# Patient Record
Sex: FEMALE | Race: WHITE | NOT HISPANIC OR LATINO | URBAN - METROPOLITAN AREA
[De-identification: names, ages, dates, MRNs, and addresses within clinical notes are randomized per-mention and may not be internally consistent; named-entity substitution may affect disease eponyms.]

---

## 2023-08-28 ENCOUNTER — INITIAL PRENATAL (OUTPATIENT)
Dept: OBGYN CLINIC | Facility: CLINIC | Age: 33
End: 2023-08-28

## 2023-08-28 VITALS — BODY MASS INDEX: 24.16 KG/M2 | HEIGHT: 68 IN | WEIGHT: 159.4 LBS

## 2023-08-28 DIAGNOSIS — Z3A.24 24 WEEKS GESTATION OF PREGNANCY: ICD-10-CM

## 2023-08-28 DIAGNOSIS — Z34.91 ENCOUNTER FOR SUPERVISION OF NORMAL PREGNANCY IN FIRST TRIMESTER, UNSPECIFIED GRAVIDITY: ICD-10-CM

## 2023-08-28 DIAGNOSIS — Z13.71 SCREENING FOR GENETIC DISEASE CARRIER STATUS: Primary | ICD-10-CM

## 2023-08-28 RX ORDER — FLUTICASONE PROPIONATE 50 MCG
1 SPRAY, SUSPENSION (ML) NASAL DAILY
COMMUNITY

## 2023-08-28 NOTE — PROGRESS NOTES
OB INTAKE INTERVIEW  Pt presents for OB intake. Transfer from [facility]. 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 Ultrasound date: 2023 10 weeks 2 days     Estimated Date of Delivery: 2023    Current Issues:  Constipation :   No  Headaches :   No  Cramping:  No  Spotting :   No    Interview education  • [facility] Pregnancy Essentials reviewed and discussed   • Baby and  [address] Handout  • [facility] MFM Handout  • Discussed genetic testing  • Prenatal lab work: Scripts printed and given to pt. • Influenza vaccine given today: No  • Discussed Tdap vaccine. Immunizations:   Immunization History   Administered Date(s) Administered   • COVID-19 PFIZER VACCINE 0.3 ML IM 2021       Diabetes              Pregestational DM:  No              hx of GDM: No              BMI >35: No              first degree relative with type 2 diabetes: No              hx of PCOS: Yes              current metformin use:  No              prior hx of LGA/macrosomia: No                   Hypertension Hx of chronic HTN: No              hx of gestational HTN: No              hx of preeclampsia, eclampsia, or HELLP syndrome: No              Family h/o preeclampsia: No              Age 28 or older: No              Multifetal gestation: No  Type 1 or Type 2 DM: No  Renal Disease: No  Autoimmune disease (systemic lupus erythematosus, antiphospholipid antibody syndrome): No  Nulliparity: Yes  Obesity (BMI over 30): No  More than 10 year pregnancy interval: No  Previous IUGR, low birthweight or small for gestational age: No       Immunizations:              influenza vaccine: N/A              discussed Tdap vaccine administration at 27-28 weeks   Covid Vaccination: Vaccinated      Dental visit with last 6 months - Yes  PHQ-2/9 score: 0       MyChart activated (not 1518 years of age)?: Yes    The patient was oriented to our practice and all questions were answered.   Interviewed by: Young Perez RN 08/28/23

## 2023-08-31 ENCOUNTER — APPOINTMENT (OUTPATIENT)
Dept: LAB | Facility: HOSPITAL | Age: 33
End: 2023-08-31
Payer: COMMERCIAL

## 2023-08-31 ENCOUNTER — ROUTINE PRENATAL (OUTPATIENT)
Dept: PERINATAL CARE | Facility: OTHER | Age: 33
End: 2023-08-31
Payer: COMMERCIAL

## 2023-08-31 VITALS
DIASTOLIC BLOOD PRESSURE: 60 MMHG | BODY MASS INDEX: 23.67 KG/M2 | WEIGHT: 156.2 LBS | HEIGHT: 68 IN | HEART RATE: 81 BPM | SYSTOLIC BLOOD PRESSURE: 98 MMHG

## 2023-08-31 DIAGNOSIS — Z33.1 PREGNANT STATE, INCIDENTAL: ICD-10-CM

## 2023-08-31 DIAGNOSIS — Z13.71 SCREENING FOR GENETIC DISEASE CARRIER STATUS: ICD-10-CM

## 2023-08-31 DIAGNOSIS — Z36.89 ENCOUNTER FOR FETAL ANATOMIC SURVEY: ICD-10-CM

## 2023-08-31 DIAGNOSIS — Z3A.24 24 WEEKS GESTATION OF PREGNANCY: Primary | ICD-10-CM

## 2023-08-31 DIAGNOSIS — Z3A.24 24 WEEKS GESTATION OF PREGNANCY: ICD-10-CM

## 2023-08-31 DIAGNOSIS — Z34.91 ENCOUNTER FOR SUPERVISION OF NORMAL PREGNANCY IN FIRST TRIMESTER, UNSPECIFIED GRAVIDITY: ICD-10-CM

## 2023-08-31 DIAGNOSIS — Z3A.25 25 WEEKS GESTATION OF PREGNANCY: Primary | ICD-10-CM

## 2023-08-31 DIAGNOSIS — Z36.9 UNSPECIFIED ANTENATAL SCREENING: ICD-10-CM

## 2023-08-31 PROBLEM — Z13.79 ASHKENAZI JEWISH ANCESTRY REQUIRING POPULATION-SPECIFIC GENETIC SCREENING: Status: ACTIVE | Noted: 2023-08-31

## 2023-08-31 PROBLEM — E28.2 POLYCYSTIC OVARIAN SYNDROME: Status: ACTIVE | Noted: 2023-08-31

## 2023-08-31 LAB
ABO GROUP BLD: NORMAL
BASOPHILS # BLD AUTO: 0.06 THOUSANDS/ÂΜL (ref 0–0.1)
BASOPHILS NFR BLD AUTO: 1 % (ref 0–1)
BLD GP AB SCN SERPL QL: NEGATIVE
EOSINOPHIL # BLD AUTO: 0.14 THOUSAND/ÂΜL (ref 0–0.61)
EOSINOPHIL NFR BLD AUTO: 1 % (ref 0–6)
ERYTHROCYTE [DISTWIDTH] IN BLOOD BY AUTOMATED COUNT: 13.9 % (ref 11.6–15.1)
HCT VFR BLD AUTO: 39.7 % (ref 34.8–46.1)
HGB BLD-MCNC: 13.5 G/DL (ref 11.5–15.4)
IMM GRANULOCYTES # BLD AUTO: 0.05 THOUSAND/UL (ref 0–0.2)
IMM GRANULOCYTES NFR BLD AUTO: 0 % (ref 0–2)
LYMPHOCYTES # BLD AUTO: 2.7 THOUSANDS/ÂΜL (ref 0.6–4.47)
LYMPHOCYTES NFR BLD AUTO: 21 % (ref 14–44)
MCH RBC QN AUTO: 31.8 PG (ref 26.8–34.3)
MCHC RBC AUTO-ENTMCNC: 34 G/DL (ref 31.4–37.4)
MCV RBC AUTO: 94 FL (ref 82–98)
MONOCYTES # BLD AUTO: 0.6 THOUSAND/ÂΜL (ref 0.17–1.22)
MONOCYTES NFR BLD AUTO: 5 % (ref 4–12)
NEUTROPHILS # BLD AUTO: 9.33 THOUSANDS/ÂΜL (ref 1.85–7.62)
NEUTS SEG NFR BLD AUTO: 72 % (ref 43–75)
NRBC BLD AUTO-RTO: 0 /100 WBCS
PLATELET # BLD AUTO: 216 THOUSANDS/UL (ref 149–390)
PMV BLD AUTO: 9.6 FL (ref 8.9–12.7)
RBC # BLD AUTO: 4.24 MILLION/UL (ref 3.81–5.12)
RH BLD: POSITIVE
RUBV IGG SERPL IA-ACNC: 29 IU/ML
SPECIMEN EXPIRATION DATE: NORMAL
WBC # BLD AUTO: 12.88 THOUSAND/UL (ref 4.31–10.16)

## 2023-08-31 PROCEDURE — 87389 HIV-1 AG W/HIV-1&-2 AB AG IA: CPT

## 2023-08-31 PROCEDURE — 86787 VARICELLA-ZOSTER ANTIBODY: CPT

## 2023-08-31 PROCEDURE — 86900 BLOOD TYPING SEROLOGIC ABO: CPT

## 2023-08-31 PROCEDURE — 87340 HEPATITIS B SURFACE AG IA: CPT

## 2023-08-31 PROCEDURE — 86780 TREPONEMA PALLIDUM: CPT

## 2023-08-31 PROCEDURE — 87086 URINE CULTURE/COLONY COUNT: CPT

## 2023-08-31 PROCEDURE — 36415 COLL VENOUS BLD VENIPUNCTURE: CPT

## 2023-08-31 PROCEDURE — 86901 BLOOD TYPING SEROLOGIC RH(D): CPT

## 2023-08-31 PROCEDURE — 86850 RBC ANTIBODY SCREEN: CPT

## 2023-08-31 PROCEDURE — 76805 OB US >/= 14 WKS SNGL FETUS: CPT | Performed by: OBSTETRICS & GYNECOLOGY

## 2023-08-31 PROCEDURE — 85025 COMPLETE CBC W/AUTO DIFF WBC: CPT

## 2023-08-31 PROCEDURE — 86803 HEPATITIS C AB TEST: CPT

## 2023-08-31 PROCEDURE — 86762 RUBELLA ANTIBODY: CPT

## 2023-08-31 NOTE — LETTER
August 31, 2023     Gerson Buitrago MD  [address] [facility]  [address]    Patient: Monique Mchugh   YOB: 1990   Date of Visit: 8/31/2023       Dear Dr. Mckenna Srinivasan:    Thank you for referring Monique Mchugh to me for evaluation. Below are my notes for this consultation. If you have questions, please do not hesitate to call me. I look forward to following your patient along with you. Sincerely,        Jason Barone MD        CC: No Recipients    Jason Barone MD  8/31/2023  4:56 PM  Sign when Signing Visit  [address]: Monique Mchugh was seen today at 24w3d for anatomic survey ultrasound. See ultrasound report under "OB Procedures" tab. Please don't hesitate to contact our office with any concerns or questions.  -Jason Barone MD        Danette Blount  8/31/2023  4:29 PM  Sign when Signing Visit    Patient chose to have [facility] Non-invasive Prenatal Screen with fetal sex. Patient given brochure and is aware [facility] will contact patients insurance and coordinate coverage. Patient made aware she will need to respond to text message or e-mail from [address] within 2 business days or testing will be run through insurance. Patient informed text message will come from area code  "415"  Provided [employer] # [phone] and web site : [email or web address]. Blood collection tubes labeled with patient identifiers (name, date of birth)    printed [facility] lab order and test kit given with FED EX packaging (Tracking # [id number])  given to patient to take to [facility] outpatient lab for blood collection. Requested patient notify MFM ( via phone call or Guatemalan Mems message)  when blood collected so office can f/u results    Copy of lab order scanned to Epic media. Maternal Fetal Medicine will have results in approximately 7-10 business days and will call patient or notify via [address] [place] [address].   Patient aware viewing lab result online will reveal fetal sex If ordered. Patient verbalized understanding of all instructions and no questions at this time.

## 2023-08-31 NOTE — PROGRESS NOTES
[address]: Mar Landin was seen today at 24w3d for anatomic survey ultrasound. See ultrasound report under "OB Procedures" tab.   Please don't hesitate to contact our office with any concerns or questions.  -Tuan Barragan MD

## 2023-08-31 NOTE — PROGRESS NOTES
Patient chose to have [facility] Non-invasive Prenatal Screen with fetal sex. Patient given brochure and is aware [facility] will contact patients insurance and coordinate coverage. Patient made aware she will need to respond to text message or e-mail from [address] within 2 business days or testing will be run through insurance. Patient informed text message will come from area code  "415"  Provided [employer] # [phone] and web site : [email or web address]. Blood collection tubes labeled with patient identifiers (name, date of birth)    printed [facility] lab order and test kit given with FED EX packaging (Tracking # [id number])  given to patient to take to [facility] outpatient lab for blood collection. Requested patient notify MFM ( via phone call or Mo-[employer] message)  when blood collected so office can f/u results    Copy of lab order scanned to Epic media. Maternal Fetal Medicine will have results in approximately 7-10 business days and will call patient or notify via [address] [place] [address]. Patient aware viewing lab result online will reveal fetal sex If ordered. Patient verbalized understanding of all instructions and no questions at this time.

## 2023-09-01 LAB
BACTERIA UR CULT: NORMAL
HBV SURFACE AG SER QL: NORMAL
HCV AB SER QL: NORMAL
TREPONEMA PALLIDUM IGG+IGM AB [PRESENCE] IN SERUM OR PLASMA BY IMMUNOASSAY: NORMAL
VZV IGG SER QL IA: NORMAL

## 2023-09-02 LAB — HIV 1+2 AB+HIV1 P24 AG SERPL QL IA: NON REACTIVE

## 2023-09-05 ENCOUNTER — ROUTINE PRENATAL (OUTPATIENT)
Dept: OBGYN CLINIC | Facility: CLINIC | Age: 33
End: 2023-09-05
Payer: COMMERCIAL

## 2023-09-05 ENCOUNTER — TELEMEDICINE (OUTPATIENT)
Facility: HOSPITAL | Age: 33
End: 2023-09-05
Payer: COMMERCIAL

## 2023-09-05 VITALS — BODY MASS INDEX: 24.63 KG/M2 | WEIGHT: 162 LBS | DIASTOLIC BLOOD PRESSURE: 62 MMHG | SYSTOLIC BLOOD PRESSURE: 108 MMHG

## 2023-09-05 DIAGNOSIS — Z3A.25 25 WEEKS GESTATION OF PREGNANCY: ICD-10-CM

## 2023-09-05 DIAGNOSIS — Z34.02 ENCOUNTER FOR SUPERVISION OF NORMAL FIRST PREGNANCY, SECOND TRIMESTER: Primary | ICD-10-CM

## 2023-09-05 DIAGNOSIS — Z31.5 ENCOUNTER FOR PROCREATIVE GENETIC COUNSELING: ICD-10-CM

## 2023-09-05 DIAGNOSIS — Z36.9 ENCOUNTER FOR ANTENATAL SCREENING OF MOTHER: Primary | ICD-10-CM

## 2023-09-05 PROCEDURE — 87491 CHLMYD TRACH DNA AMP PROBE: CPT | Performed by: PHYSICIAN ASSISTANT

## 2023-09-05 PROCEDURE — 99213 OFFICE O/P EST LOW 20 MIN: CPT | Performed by: PHYSICIAN ASSISTANT

## 2023-09-05 PROCEDURE — NC001 PR NO CHARGE

## 2023-09-05 PROCEDURE — 87591 N.GONORRHOEAE DNA AMP PROB: CPT | Performed by: PHYSICIAN ASSISTANT

## 2023-09-05 NOTE — PROGRESS NOTES
Patient is a 36 YO  female presenting to the office at 25.1 weeks for routine OB care. BP: 108/62  TWlb  Fetal Movement: yes good movement  Feeling well today  Denies LOF, Ctx, VB  Pt is transfer, previously received all care in [place].  Labs UTD   Blood type O positive per most recent type and screen - WEAK D  Pt previous blood types all reported as O neg, will receive rhogam this pregnancy  Oriented to practice, how to access on call  Reviewed precautions  Call for concerns  RTO 4 weeks

## 2023-09-05 NOTE — PROGRESS NOTES
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 Ivin Frankel was informed that this is a telemedicine visit and that the visit is being conducted through the Compliance Control. She agrees to proceed. .  My office door was closed. The patient was notified the following individuals were present in the room: Efra Mendoza, genetic counselor; Jovany, OB/GYN resident. She acknowledged consent and understanding of privacy and security of the video platform. The patient has agreed to participate and understands they can discontinue the visit at any time. Assessment/Subjective    Patel Rodriguez is a 35 y.o. female who presented with her partner Jovany for genetic counseling to discuss carrier screening relating to her Ashkenazi Church ancestry. Histories for the patient and her partner's family were taken during our session. The patient's partner reports a maternal first cousin (mother's sister's son) born with a cleft lip, and denies any other known instances of oral cleft in his family history. This history unlikely to increase the risk to the pregnancy over the general population risk. Further review of family history for the patient and her partner was noncontributory.  The family history was not significant for genetic diseases or disorders, intellectual disability, fetal loss, or consanguinity. 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 Phenylalanine hydroxylase (PAH) deficiency is an autosomal recessive metabolic condition caused by biallelic variants in the [facility] gene. PAH deficiency in a  is usually but not always detected by  screening. We reviewed that clinical confirmation of DTC genetic testing results is always recommended. Should the patient's PAH variant be confirmed, the next step would be testing her partner for variants in the same gene to determine whether both parents are carriers for this condition. Patient reports being of [place], [place], and [place] descent and that her  is of [place], [place], and [place] descent. She reports Ashkenazi Che[place]ism ancestry. We reviewed that certain diseases are more common in individuals of Ashkenazi Cheondoism descent and therefore couples are at an increased risk of offspring having one of these conditions. Many of these disorders are lethal in childhood or carry a significant risk for morbidity and mortality. These disease are inherited in an autosomal recessive pattern, thus both parents must carry a gene mutation in order for there to be a risk for the pregnancy to be affected. The benefits and limitations of Cystic fibrosis (CF), Spinal muscular atrophy (SMA), hemoglobinopathy, and expanded carrier screening was discussed, as well as carrier screening based on Ashkenazi Che[employer]ism ethnic background. The patient is considering expanded carrier screening with the inclusion of genes known to have a higher likelihood of carrier status with an Ashkenazi Cheondoism ethnic background; of note, the available panels will also analyze the [facility] gene.  Given the patient's gestational age and expected turnaround time for genetic testing, the option of testing both she and her partner concurrently was offered. Patient would likely just have personal screening performed and then testing for her partner as needed. The patient plans to look over different carrier panel options sent via [employer] and inform us of her choice. The patient had blood drawn for cell free fetal DNA screening (also known as noninvasive prenatal testing or NIPS) on 23; results are pending at the time of our appointment. Patient expressed understanding of the screening performed and had no questions relating to this bloodwork. We reviewed additional testing options. The risks, benefits, and limitations of amniocentesis were discussed with the patient. Amniocentesis is performed starting at 16 weeks gestation, using direct real time ultrasound visualization to avoid both the fetus and the placenta. Once amniotic fluid is withdrawn, laboratory analysis is performed and amniotic fluid alpha-fetoprotein, as well as chromosome and/or microarray analysis is undertaken. Single gene disorder testing can also be completed. The risk of genetic amniocentesis includes, but is not limited to less than 1 in 300 pregnancy loss rate or  delivery rate if 23 weeks or greater, infection, bleeding, rupture of membranes, failure of cells to grow, karyotype error, laboratory error, etc. Occasionally a repeat amniocentesis is necessary due to cell culture failure. Chromosome/microarray analysis from amniocentesis is 99.9% accurate and alpha-fetoprotein analysis can detect approximately 95% of open neural tube defects. We reviewed that in the event the patient and her partner are carriers for the same genetic condition, amniocentesis is the only available route during pregnancy that can determine the genetic status of her fetus. Patient declined amniocentesis at this time.     Lastly, we discussed the fact that everyone in the general population regardless of age, family history, or medical background has approximately a 3-5% risk of having a child with some type of congenital anomaly, genetic disease or intellectual disability. Currently there are no tests available to rule out all birth defects or health problems. Stephanie Hayden was provided with information on different carrier testing options and our contact information. I encouraged her to call with any questions or concerns. Plan/Tests Ordered:  1) Patient declined amniocentesis. 2) Patient considering expanded carrier screening options - options sent via [employer] message, patient will contact office with testing decision. 3) NIPS pending. 4) Follow-up ultrasound scheduled on 9/28/2023.       Time spent with Genetic Counselor: 30 minutes

## 2023-09-05 NOTE — PROGRESS NOTES
Genetic Counseling Note    Appointment Date:  2023  Referred By: Tamara Sorenson  YOB: 1990  Partner:  Barbara Wade  Indication for Visit:  Ashkenazi Sikh ancestry  Pregnancy History:   Estimated Date of Delivery: 2023  Estimated Gestational Age: 18w3d

## 2023-09-05 NOTE — PROGRESS NOTES
Initial prenatal visit, needs urine gc/chlam today. Patient would like to discuss the glucose testing more but otherwise she is doing well.

## 2023-09-06 LAB
C TRACH DNA SPEC QL NAA+PROBE: NEGATIVE
MISCELLANEOUS LAB TEST RESULT: NORMAL
N GONORRHOEA DNA SPEC QL NAA+PROBE: NEGATIVE

## 2023-09-11 NOTE — RESULT ENCOUNTER NOTE
Reassuring prenatal labs. Hgb 13.5, plts 216.    Weak D present on Type and Screen - Rhogam recommended

## 2023-09-14 ENCOUNTER — APPOINTMENT (OUTPATIENT)
Dept: LAB | Facility: HOSPITAL | Age: 33
End: 2023-09-14
Payer: COMMERCIAL

## 2023-09-14 DIAGNOSIS — Z3A.24 24 WEEKS GESTATION OF PREGNANCY: ICD-10-CM

## 2023-09-14 DIAGNOSIS — Z33.1 PREGNANT STATE, INCIDENTAL: ICD-10-CM

## 2023-09-14 DIAGNOSIS — Z34.91 ENCOUNTER FOR SUPERVISION OF NORMAL PREGNANCY IN FIRST TRIMESTER, UNSPECIFIED GRAVIDITY: ICD-10-CM

## 2023-09-14 DIAGNOSIS — Z36.9 UNSPECIFIED ANTENATAL SCREENING: ICD-10-CM

## 2023-09-14 DIAGNOSIS — Z13.71 SCREENING FOR GENETIC DISEASE CARRIER STATUS: ICD-10-CM

## 2023-09-14 LAB — GLUCOSE 1H P 50 G GLC PO SERPL-MCNC: 84 MG/DL (ref 40–134)

## 2023-09-14 PROCEDURE — 82950 GLUCOSE TEST: CPT

## 2023-09-14 PROCEDURE — 36415 COLL VENOUS BLD VENIPUNCTURE: CPT

## 2023-09-14 PROCEDURE — 81220 CFTR GENE COM VARIANTS: CPT

## 2023-09-14 PROCEDURE — 81329 SMN1 GENE DOS/DELETION ALYS: CPT

## 2023-09-19 LAB
CITATION REF LAB TEST: NORMAL
CLINICAL INFO: NORMAL
ETHNIC BACKGROUND STATED: NORMAL
GENE DIS ANL CARRIER INTERP-IMP: NORMAL
GENE MUT TESTED BLD/T: NORMAL
LAB DIRECTOR NAME PROVIDER: NORMAL
REASON FOR REFERRAL (NARRATIVE): NORMAL
RECOMMENDATION PATIENT DOC-IMP: NORMAL
REF LAB TEST METHOD: NORMAL
SERVICE CMNT-IMP: NORMAL
SMN1 GENE MUT ANL BLD/T: NORMAL
SPECIMEN SOURCE: NORMAL

## 2023-09-21 ENCOUNTER — OFFICE VISIT (OUTPATIENT)
Dept: FAMILY MEDICINE CLINIC | Facility: CLINIC | Age: 33
End: 2023-09-21
Payer: COMMERCIAL

## 2023-09-21 ENCOUNTER — TELEPHONE (OUTPATIENT)
Dept: PERINATAL CARE | Facility: CLINIC | Age: 33
End: 2023-09-21

## 2023-09-21 VITALS
OXYGEN SATURATION: 95 % | TEMPERATURE: 97.6 F | HEART RATE: 85 BPM | SYSTOLIC BLOOD PRESSURE: 110 MMHG | BODY MASS INDEX: 24.98 KG/M2 | DIASTOLIC BLOOD PRESSURE: 64 MMHG | WEIGHT: 164.8 LBS | HEIGHT: 68 IN

## 2023-09-21 DIAGNOSIS — M54.32 BILATERAL SCIATICA: ICD-10-CM

## 2023-09-21 DIAGNOSIS — Z3A.28 28 WEEKS GESTATION OF PREGNANCY: Primary | ICD-10-CM

## 2023-09-21 DIAGNOSIS — M54.31 BILATERAL SCIATICA: ICD-10-CM

## 2023-09-21 DIAGNOSIS — M21.70 LEG LENGTH DISCREPANCY: ICD-10-CM

## 2023-09-21 DIAGNOSIS — M51.26 LUMBAR DISC HERNIATION: ICD-10-CM

## 2023-09-21 LAB
CF COMMENT: NORMAL
CFTR MUT ANL BLD/T: NORMAL

## 2023-09-21 PROCEDURE — 99204 OFFICE O/P NEW MOD 45 MIN: CPT | Performed by: FAMILY MEDICINE

## 2023-09-21 NOTE — TELEPHONE ENCOUNTER
I called Umesh Parr at [phone] to talk about her [facility] comprehensive carrier screen order. Per [facility], the test sample failed and a new sample needs to be collected. I left a message stating who was calling and requested that the patient call back at [phone].      Tamra Mccabe   genetic counselor

## 2023-09-21 NOTE — PROGRESS NOTES
Assessment/Plan:    1. 28 weeks gestation of pregnancy  -     Ambulatory Referral to Physical Therapy; Future    2. Leg length discrepancy  -     Ambulatory Referral to Physical Therapy; Future    3. Lumbar disc herniation  -     Ambulatory Referral to Physical Therapy; Future    4. Bilateral sciatica  -     Ambulatory Referral to Physical Therapy; Future        There are no Patient Instructions on file for this visit. Return if symptoms worsen or fail to improve. Subjective:      Patient ID: Shukri Mcintyre is a 35 y.o. female. Chief Complaint   Patient presents with   • New Patient Visit       Here to establish. Has a spot on her L arm that started a few months ago prior to her pregnancy, not changing. Dad had SCC or basal cell, no melanoma. H/o lumbar herniated disc. Feeling sciatica pain. Pt almost 28 weeks pregnant in a few days. Has not looked into chiro yet but considering. Using topical treatments. Recently relocated here form [facility] where she lived for 5 years with her . The following portions of the patient's history were reviewed and updated as appropriate: allergies, current medications, past family history, past medical history, past social history, past surgical history and problem list.    Review of Systems   Constitutional: Negative for fatigue and fever. HENT: Negative for congestion. Eyes: Negative for visual disturbance. Respiratory: Negative for chest tightness and shortness of breath. Cardiovascular: Negative for chest pain and palpitations. Gastrointestinal: Negative for abdominal pain. Genitourinary: Negative for difficulty urinating. Musculoskeletal: Negative for arthralgias. Neurological: Negative for headaches. Hematological: Does not bruise/bleed easily.          Current Outpatient Medications   Medication Sig Dispense Refill   • fluticasone (FLONASE) 50 mcg/act nasal spray 1 spray into each nostril daily     • MAGNESIUM GLYCINATE PO Take by mouth • Prenatal Vit-Fe Fumarate-FA (PRENATAL VITAMIN PO) Take by mouth       No current facility-administered medications for this visit. Objective:    /64 (BP Location: Left arm, Patient Position: Sitting, Cuff Size: Standard)   Pulse 85   Temp 97.6 °F (36.4 °C) (Temporal)   Ht 5' 8" (1.727 m)   Wt 74.8 kg (164 lb 12.8 oz)   LMP 03/04/2023   SpO2 95%   BMI 25.06 kg/m²        Physical Exam  Vitals reviewed. Constitutional:       Appearance: Normal appearance. She is well-developed. Cardiovascular:      Rate and Rhythm: Normal rate and regular rhythm. Heart sounds: Normal heart sounds. Pulmonary:      Effort: Pulmonary effort is normal.      Breath sounds: Normal breath sounds. Musculoskeletal:      Right lower leg: No edema. Left lower leg: No edema. Skin:     General: Skin is warm. Neurological:      Mental Status: She is alert and oriented to person, place, and time. Psychiatric:         Mood and Affect: Mood normal.         Behavior: Behavior normal.         Thought Content:  Thought content normal.         Judgment: Judgment normal.                Armando Redding MD

## 2023-09-22 NOTE — TELEPHONE ENCOUNTER
Mary Silva left a voicemail in response to my message yesterday on 23; I called her back today at [phone] and confirmed her . Patient stated she would prefer to have her blood drawn at her upcoming ultrasound appointment on 23 at the [facility] office. Patient verbalized understanding and had no further questions at this time. I will place the order and fax the test requisition to the [facility] office on Wednesday, 23. Additional [facility] carrier screening kits were sent to the [facility] office today via interoffice mail.     Rosa Lizarraga   genetic counselor

## 2023-09-28 ENCOUNTER — ULTRASOUND (OUTPATIENT)
Dept: PERINATAL CARE | Facility: OTHER | Age: 33
End: 2023-09-28
Payer: COMMERCIAL

## 2023-09-28 ENCOUNTER — ROUTINE PRENATAL (OUTPATIENT)
Dept: OBGYN CLINIC | Facility: CLINIC | Age: 33
End: 2023-09-28
Payer: COMMERCIAL

## 2023-09-28 VITALS — DIASTOLIC BLOOD PRESSURE: 62 MMHG | BODY MASS INDEX: 25.7 KG/M2 | SYSTOLIC BLOOD PRESSURE: 108 MMHG | WEIGHT: 169 LBS

## 2023-09-28 VITALS
SYSTOLIC BLOOD PRESSURE: 110 MMHG | BODY MASS INDEX: 25.22 KG/M2 | HEART RATE: 76 BPM | HEIGHT: 68 IN | WEIGHT: 166.4 LBS | DIASTOLIC BLOOD PRESSURE: 60 MMHG

## 2023-09-28 DIAGNOSIS — Z36.2 ENCOUNTER FOR FOLLOW-UP ULTRASOUND OF FETAL ANATOMY: ICD-10-CM

## 2023-09-28 DIAGNOSIS — Z34.83 ENCOUNTER FOR SUPERVISION OF OTHER NORMAL PREGNANCY, THIRD TRIMESTER: ICD-10-CM

## 2023-09-28 DIAGNOSIS — O26.893 RH NEGATIVE STATUS DURING PREGNANCY IN THIRD TRIMESTER: Primary | ICD-10-CM

## 2023-09-28 DIAGNOSIS — Z67.91 RH NEGATIVE STATUS DURING PREGNANCY IN THIRD TRIMESTER: Primary | ICD-10-CM

## 2023-09-28 DIAGNOSIS — Z3A.28 28 WEEKS GESTATION OF PREGNANCY: ICD-10-CM

## 2023-09-28 DIAGNOSIS — Z13.79 ASHKENAZI JEWISH ANCESTRY REQUIRING POPULATION-SPECIFIC GENETIC SCREENING: Primary | ICD-10-CM

## 2023-09-28 DIAGNOSIS — Z36.89 ENCOUNTER FOR ULTRASOUND TO ASSESS FETAL GROWTH: ICD-10-CM

## 2023-09-28 LAB
DME PARACHUTE DELIVERY DATE REQUESTED: NORMAL
DME PARACHUTE ITEM DESCRIPTION: NORMAL
DME PARACHUTE ORDER STATUS: NORMAL
DME PARACHUTE SUPPLIER NAME: NORMAL
DME PARACHUTE SUPPLIER PHONE: NORMAL

## 2023-09-28 PROCEDURE — 99213 OFFICE O/P EST LOW 20 MIN: CPT | Performed by: OBSTETRICS & GYNECOLOGY

## 2023-09-28 PROCEDURE — 76816 OB US FOLLOW-UP PER FETUS: CPT | Performed by: OBSTETRICS & GYNECOLOGY

## 2023-09-28 PROCEDURE — 96372 THER/PROPH/DIAG INJ SC/IM: CPT | Performed by: OBSTETRICS & GYNECOLOGY

## 2023-09-28 NOTE — PROGRESS NOTES
[address]: Ivin Frankel was seen today at 28w3d for fetal growth and followup missed anatomy ultrasound. See ultrasound report under "OB Procedures" tab. [facility] carrier screening was re-drawn after her ultrasound today.  Please don't hesitate to contact our office with any concerns or questions.  -Kitty Lyon MD

## 2023-09-28 NOTE — LETTER
September 28, 2023     Krupa Cruz PA-C  [address]  [address]     Patient: Devante Cummings   YOB: 1990   Date of Visit: 9/28/2023       Dear Qi Rodriguez: Thank you for referring Devante Cummings to me for evaluation. Below are my notes for this consultation. If you have questions, please do not hesitate to call me. I look forward to following your patient along with you. Sincerely,        Shine Umaña MD        CC: No Recipients    Shine Umaña MD  9/28/2023  3:18 PM  Sign when Signing Visit  [address]: Devante Cummings was seen today at 28w3d for fetal growth and followup missed anatomy ultrasound. See ultrasound report under "OB Procedures" tab. [facility] carrier screening was re-drawn after her ultrasound today.  Please don't hesitate to contact our office with any concerns or questions.  -Shine Umaña MD

## 2023-09-28 NOTE — PROGRESS NOTES
Pt is here for her 28w prenatal. Red folder and breast pump due. Pt denies any swelling,leakage,bleeding,pressure. Pt states her stomach gets a bit hard at times. Pt would like to get tdap next visit and discuss rhogam before receiving today.  Urine dip is neg/neg

## 2023-09-28 NOTE — PROGRESS NOTES
Patient is a 36 YO  female presenting to the office at 28.3 weeks for routine OB care. BP: 108/62  TWlb  Fetal Movement: yes good movement  Feeling well today  Denies LOF, CTX, VB  Discussed third trimester teaching  Reviewed fetal kick counts, normal FM  Reviewed signs of PTL  Reviewed red folder, consents, birth plan  Delivery consent obtained   Breastfeeding: plans to  Breast Pump: ordered  TDAP: next visit  FLU Vaccine: recommend  COVID Vaccine: recommend  Rhogam given today - weak D antibody  28 Week Labs:  WNL  RTO 2 weeks for routine OB F/U

## 2023-10-02 ENCOUNTER — EVALUATION (OUTPATIENT)
Dept: PHYSICAL THERAPY | Facility: CLINIC | Age: 33
End: 2023-10-02
Payer: COMMERCIAL

## 2023-10-02 DIAGNOSIS — M54.31 BILATERAL SCIATICA: ICD-10-CM

## 2023-10-02 DIAGNOSIS — Z3A.28 28 WEEKS GESTATION OF PREGNANCY: ICD-10-CM

## 2023-10-02 DIAGNOSIS — M51.26 LUMBAR DISC HERNIATION: ICD-10-CM

## 2023-10-02 DIAGNOSIS — M21.70 LEG LENGTH DISCREPANCY: ICD-10-CM

## 2023-10-02 DIAGNOSIS — M54.32 BILATERAL SCIATICA: ICD-10-CM

## 2023-10-02 PROCEDURE — 97162 PT EVAL MOD COMPLEX 30 MIN: CPT | Performed by: PHYSICAL THERAPIST

## 2023-10-02 PROCEDURE — 97110 THERAPEUTIC EXERCISES: CPT | Performed by: PHYSICAL THERAPIST

## 2023-10-02 PROCEDURE — 97112 NEUROMUSCULAR REEDUCATION: CPT | Performed by: PHYSICAL THERAPIST

## 2023-10-02 RX ORDER — CETIRIZINE HYDROCHLORIDE 10 MG/1
10 TABLET ORAL DAILY
COMMUNITY

## 2023-10-02 NOTE — PROGRESS NOTES
PT Evaluation     Today's date: 10/2/2023  Patient name: Shukri Mcintyre  : 1990  MRN: [id number]  Referring provider: Lula Howard MD  Dx:   Encounter Diagnosis     ICD-10-CM    1. 28 weeks gestation of pregnancy  Z3A.28 Ambulatory Referral to Physical Therapy      2. Leg length discrepancy  M21.70 Ambulatory Referral to Physical Therapy      3. Lumbar disc herniation  M51.26 Ambulatory Referral to Physical Therapy      4. Bilateral sciatica  M54.31 Ambulatory Referral to Physical Therapy    M54.32                      Assessment  Assessment details: Patient is a 26-year-old female who is currently 29 weeks pregnant with chief complaints of low back pain. She has a history of leg length discrepancy, scoliosis, and previous low back and left leg pain. Patient presents with decreased functional mobility due to increased pain, decreased hip and core strength, decreased lumbar and left leg ROM. Patient will benefit from skilled physical therapy to address impairment and improve functional mobility. PT needed to allow for return to maximal function and improve quality of life. Discussed current plan to include hip strengthening, lower extremity stretching, and use of left heel lift to address leg length discrepancy. Educated patient on benefit of postpartum PT as well. Impairments: abnormal or restricted ROM, activity intolerance, impaired physical strength, lacks appropriate home exercise program and pain with function  Understanding of Dx/Px/POC: good   Prognosis: good    Goals  STG within 4 weeks:   1. Patient to be independent in HEP. 2. Reduce pain by 50% to improve quality of life. 3.  Improve hip strength to 4+ in all planes. LTG within 8 weeks:   1. Patient to be independent in ADLs/IADLs without difficulty. 2.  Patient to be able to self manage symptoms with stretching and light exercise routine. 3.  Patient to be independent and comprehensive HEP.     Plan  Patient would benefit from: skilled physical therapy and PT eval  Planned modality interventions: cryotherapy, hydrotherapy and unattended electrical stimulation  Planned therapy interventions: therapeutic training, therapeutic exercise, therapeutic activities, stretching, strengthening, postural training, patient education, neuromuscular re-education, manual therapy, joint mobilization, IADL retraining, activity modification, ADL retraining, ADL training, body mechanics training, flexibility, functional ROM exercises, gait training, graded activity, graded exercise, graded motor and home exercise program  Frequency: 1x week  Duration in weeks: 8  Plan of Care beginning date: 10/2/2023  Plan of Care expiration date: 2023  Treatment plan discussed with: patient        Subjective Evaluation    History of Present Illness  Mechanism of injury: Patient is a 34 y/o female with chief complaints of low back pain. Today, she is 29 weeks pregnant. She states she's had numbness in her left heel and radicular pain into her left calf since January. She states that's improved since onset. She states she has a history of leg length discrepancy and scoliosis. She reports she also has a history of herniated disc. She states some days are worse then others. She states her hips are really bothering her and feel that they aren't aligned. She was referred for evaluation and treatment of low back. Patient Goals  Patient goals for therapy: decreased pain    Pain  At best pain ratin  At worst pain ratin  Quality: dull ache and radiating  Alleviating factors: light stretches   Exacerbated by: walking long distances.   Progression: worsening    Social Support    Employment status: working (works from home; deskwork )  Exercise history: patient was walking and doing yoga, but has not done much for the last month       Diagnostic Tests  Abnormal: Left Achilles tendonitis and plantar fascia Treatments  No previous or current treatments        Objective     Active Range of Motion     Lumbar   Flexion:  Restriction level: moderate  Extension:  Restriction level: moderate  Left lateral flexion:  Restriction level: moderate  Right lateral flexion:  Restriction level: moderate    Strength/Myotome Testing     Left Hip   Planes of Motion   Flexion: 4    Right Hip   Planes of Motion   Flexion: 4    Left Knee   Flexion: 4+  Extension: 4+    Right Knee   Flexion: 4+  Extension: 4+    Left Ankle/Foot   Dorsiflexion: 4+  Great toe extension: 4+    Right Ankle/Foot   Dorsiflexion: 4+  Great toe extension: 4+    Tests     Left Hip   Negative SHEMAR and FADIR. Right Hip   Negative SHEMAR and FADIR. Additional Tests Details  Supine to sit test: Left short -- stays short   = True leg length discrepancy              Precautions: Pregnant (due 12/18/23); true leg length discrepancy     [email or web address]  Access Code: [email or web address]    Increased time spent on patient education with diagnosis, prognosis, goals of therapy, progression of therapy, and plan of care. All questions answered. Patient instructed to call clinic with questions or concerns.      POC expires Unit limit Auth Expiration date PT/OT + Visit Limit?   11/27/23 BOMN n/a BOMN                           Visit/Unit Tracking  AUTH Status:  Date 10/2              Required after 24th visit  Used 1               Remaining  23                    Manuals 10/2                                                                Neuro Re-Ed             Adductor set 3"X20             HL hip abd  Red 2x10 ea            Bridge HEP instruction                                                                 Ther Ex             Pt Edu  KS            Heel lift- L  KS             Recumbent bike NV            Standing hip abd/ext Red 2x10 ea             HS stretch w strap  L 5x20"                                                                 Ther Activity                                       Gait Training Modalities

## 2023-10-04 ENCOUNTER — TELEPHONE (OUTPATIENT)
Dept: OBGYN CLINIC | Facility: CLINIC | Age: 33
End: 2023-10-04

## 2023-10-04 NOTE — TELEPHONE ENCOUNTER
Prenatal pt (29w2d) called in regards to spotting after intercourse. Pt stated she had spotting when wiping after urination. Pt reassured and notified to monitor bleeding, if increased bleeding occurred to report to labor and delivery per Dr. Yaima Kc.

## 2023-10-09 ENCOUNTER — OFFICE VISIT (OUTPATIENT)
Dept: PHYSICAL THERAPY | Facility: CLINIC | Age: 33
End: 2023-10-09
Payer: COMMERCIAL

## 2023-10-09 DIAGNOSIS — M51.26 LUMBAR DISC HERNIATION: ICD-10-CM

## 2023-10-09 DIAGNOSIS — M21.70 LEG LENGTH DISCREPANCY: Primary | ICD-10-CM

## 2023-10-09 DIAGNOSIS — M54.32 BILATERAL SCIATICA: ICD-10-CM

## 2023-10-09 DIAGNOSIS — M54.31 BILATERAL SCIATICA: ICD-10-CM

## 2023-10-09 PROCEDURE — 97112 NEUROMUSCULAR REEDUCATION: CPT | Performed by: PHYSICAL THERAPIST

## 2023-10-09 PROCEDURE — 97110 THERAPEUTIC EXERCISES: CPT | Performed by: PHYSICAL THERAPIST

## 2023-10-09 NOTE — PROGRESS NOTES
Daily Note     Today's date: 10/9/2023  Patient name: Robert Teague  : 1990  MRN: [id number]  Referring provider: Kaye Camara MD  Dx:   Encounter Diagnosis     ICD-10-CM    1. Leg length discrepancy  M21.70       2. Lumbar disc herniation  M51.26       3. Bilateral sciatica  M54.31     M54.32                      Subjective: Patient states she's overall doing pretty well. Denies radiating pain into LE. Objective: See treatment diary below      Assessment: Tolerated treatment well. Patient able to add side step with band, seated march and LAQ on physioball, SL clamshell, and seated piriformis stretch. Also added bike this visit. Updated her online HEP. Patient would benefit from continued PT. No complaints post session. Educated in gradual and minimal increase in intensity while pregnant. Plan: Continue per plan of care. Precautions: Pregnant (due 23); true leg length discrepancy     [email or web address]  Access Code: [email or web address]        POC expires Unit limit Auth Expiration date PT/OT + Visit Limit?   23 BOMN n/a BOMN                           Visit/Unit Tracking  AUTH Status:  Date 10/2 10/9             Required after 24th visit  Used 1 2              Remaining                     Manuals 10/2 10/9                                                                Neuro Re-Ed             Adductor set 3"X20             HL hip abd  Red 2x10 ea            Bridge HEP instruction  HEP review            Seated PB march & LAQ   2x10 ea            SL clamshell   10"X10 ea            Paloff press   NV                        Ther Ex             Pt Edu  KS            Heel lift- L  KS             Recumbent bike NV 5 min           Standing hip abd/ext Red 2x10 ea  Red 2x10 ea            HS stretch w strap  L 5x20"             Side step w band   Red x 4 at rail            Seated piriformis stretch   3x20" ea                                      Ther Activity Gait Training                                       Modalities

## 2023-10-11 ENCOUNTER — ROUTINE PRENATAL (OUTPATIENT)
Dept: OBGYN CLINIC | Facility: CLINIC | Age: 33
End: 2023-10-11
Payer: COMMERCIAL

## 2023-10-11 VITALS — SYSTOLIC BLOOD PRESSURE: 102 MMHG | WEIGHT: 169 LBS | DIASTOLIC BLOOD PRESSURE: 60 MMHG | BODY MASS INDEX: 25.7 KG/M2

## 2023-10-11 DIAGNOSIS — Z23 ENCOUNTER FOR IMMUNIZATION: ICD-10-CM

## 2023-10-11 DIAGNOSIS — O26.893 RH NEGATIVE STATE IN ANTEPARTUM PERIOD, THIRD TRIMESTER: Primary | ICD-10-CM

## 2023-10-11 DIAGNOSIS — Z67.91 RH NEGATIVE STATE IN ANTEPARTUM PERIOD, THIRD TRIMESTER: Primary | ICD-10-CM

## 2023-10-11 DIAGNOSIS — Z3A.30 30 WEEKS GESTATION OF PREGNANCY: ICD-10-CM

## 2023-10-11 PROCEDURE — 99215 OFFICE O/P EST HI 40 MIN: CPT | Performed by: OBSTETRICS & GYNECOLOGY

## 2023-10-11 PROCEDURE — 90471 IMMUNIZATION ADMIN: CPT | Performed by: OBSTETRICS & GYNECOLOGY

## 2023-10-11 PROCEDURE — 90715 TDAP VACCINE 7 YRS/> IM: CPT | Performed by: OBSTETRICS & GYNECOLOGY

## 2023-10-11 NOTE — PROGRESS NOTES
35 y.o.  female at 30w2d (Estimated Date of Delivery: 23) for PNV.     Pre- Vitals      Flowsheet Row Most Recent Value   Prenatal Assessment    Fetal Heart Rate 145   Movement Present   Prenatal Vitals    Blood Pressure 102/60   Weight - Scale 76.7 kg (169 lb)   Urine Albumin/Glucose    Dilation/Effacement/Station    Vaginal Drainage    Edema           TW.526 kg (21 lb)    Doing well 3rd trimester  Tdap today  Discussed birth plan, desires minimal intervention  Planning for cord blood banking

## 2023-10-12 ENCOUNTER — TELEPHONE (OUTPATIENT)
Dept: OBGYN CLINIC | Facility: CLINIC | Age: 33
End: 2023-10-12

## 2023-10-12 ENCOUNTER — HOSPITAL ENCOUNTER (EMERGENCY)
Facility: HOSPITAL | Age: 33
Discharge: HOME | End: 2023-10-12
Attending: EMERGENCY MEDICINE
Payer: COMMERCIAL

## 2023-10-12 VITALS
TEMPERATURE: 97.3 F | DIASTOLIC BLOOD PRESSURE: 66 MMHG | HEART RATE: 69 BPM | RESPIRATION RATE: 20 BRPM | SYSTOLIC BLOOD PRESSURE: 116 MMHG | OXYGEN SATURATION: 99 %

## 2023-10-12 DIAGNOSIS — R00.2 PALPITATIONS: Primary | ICD-10-CM

## 2023-10-12 LAB
ALBUMIN SERPL BCP-MCNC: 3.8 G/DL (ref 3.5–5)
ALP SERPL-CCNC: 62 U/L (ref 34–104)
ALT SERPL W P-5'-P-CCNC: 29 U/L (ref 7–52)
ANION GAP SERPL CALCULATED.3IONS-SCNC: 7 MMOL/L
AST SERPL W P-5'-P-CCNC: 26 U/L (ref 13–39)
BASOPHILS # BLD AUTO: 0.04 THOUSANDS/ÂΜL (ref 0–0.1)
BASOPHILS NFR BLD AUTO: 0 % (ref 0–1)
BILIRUB SERPL-MCNC: 0.96 MG/DL (ref 0.2–1)
BILIRUB UR QL STRIP: NEGATIVE
BUN SERPL-MCNC: 6 MG/DL (ref 5–25)
CALCIUM SERPL-MCNC: 8.8 MG/DL (ref 8.4–10.2)
CHLORIDE SERPL-SCNC: 103 MMOL/L (ref 96–108)
CLARITY UR: CLEAR
CO2 SERPL-SCNC: 24 MMOL/L (ref 21–32)
COLOR UR: COLORLESS
CREAT SERPL-MCNC: 0.47 MG/DL (ref 0.6–1.3)
EOSINOPHIL # BLD AUTO: 0.09 THOUSAND/ÂΜL (ref 0–0.61)
EOSINOPHIL NFR BLD AUTO: 1 % (ref 0–6)
ERYTHROCYTE [DISTWIDTH] IN BLOOD BY AUTOMATED COUNT: 13 % (ref 11.6–15.1)
GFR SERPL CREATININE-BSD FRML MDRD: 130 ML/MIN/1.73SQ M
GLUCOSE SERPL-MCNC: 92 MG/DL (ref 65–140)
GLUCOSE UR STRIP-MCNC: NEGATIVE MG/DL
HCT VFR BLD AUTO: 38.6 % (ref 34.8–46.1)
HGB BLD-MCNC: 13.2 G/DL (ref 11.5–15.4)
HGB UR QL STRIP.AUTO: NEGATIVE
IMM GRANULOCYTES # BLD AUTO: 0.05 THOUSAND/UL (ref 0–0.2)
IMM GRANULOCYTES NFR BLD AUTO: 0 % (ref 0–2)
KETONES UR STRIP-MCNC: NEGATIVE MG/DL
LEUKOCYTE ESTERASE UR QL STRIP: NEGATIVE
LYMPHOCYTES # BLD AUTO: 2.06 THOUSANDS/ÂΜL (ref 0.6–4.47)
LYMPHOCYTES NFR BLD AUTO: 16 % (ref 14–44)
MAGNESIUM SERPL-MCNC: 1.9 MG/DL (ref 1.9–2.7)
MCH RBC QN AUTO: 32 PG (ref 26.8–34.3)
MCHC RBC AUTO-ENTMCNC: 34.2 G/DL (ref 31.4–37.4)
MCV RBC AUTO: 94 FL (ref 82–98)
MONOCYTES # BLD AUTO: 0.62 THOUSAND/ÂΜL (ref 0.17–1.22)
MONOCYTES NFR BLD AUTO: 5 % (ref 4–12)
NEUTROPHILS # BLD AUTO: 9.75 THOUSANDS/ÂΜL (ref 1.85–7.62)
NEUTS SEG NFR BLD AUTO: 78 % (ref 43–75)
NITRITE UR QL STRIP: NEGATIVE
NRBC BLD AUTO-RTO: 0 /100 WBCS
PH UR STRIP.AUTO: 6.5 [PH]
PLATELET # BLD AUTO: 189 THOUSANDS/UL (ref 149–390)
PMV BLD AUTO: 9.4 FL (ref 8.9–12.7)
POTASSIUM SERPL-SCNC: 3.6 MMOL/L (ref 3.5–5.3)
PROT SERPL-MCNC: 6.7 G/DL (ref 6.4–8.4)
PROT UR STRIP-MCNC: NEGATIVE MG/DL
RBC # BLD AUTO: 4.12 MILLION/UL (ref 3.81–5.12)
SODIUM SERPL-SCNC: 134 MMOL/L (ref 135–147)
SP GR UR STRIP.AUTO: 1 (ref 1–1.03)
UROBILINOGEN UR STRIP-ACNC: <2 MG/DL
WBC # BLD AUTO: 12.61 THOUSAND/UL (ref 4.31–10.16)

## 2023-10-12 PROCEDURE — 99285 EMERGENCY DEPT VISIT HI MDM: CPT | Performed by: EMERGENCY MEDICINE

## 2023-10-12 PROCEDURE — 83735 ASSAY OF MAGNESIUM: CPT | Performed by: EMERGENCY MEDICINE

## 2023-10-12 PROCEDURE — 99285 EMERGENCY DEPT VISIT HI MDM: CPT

## 2023-10-12 PROCEDURE — 36415 COLL VENOUS BLD VENIPUNCTURE: CPT | Performed by: EMERGENCY MEDICINE

## 2023-10-12 PROCEDURE — 93005 ELECTROCARDIOGRAM TRACING: CPT

## 2023-10-12 PROCEDURE — 80053 COMPREHEN METABOLIC PANEL: CPT | Performed by: EMERGENCY MEDICINE

## 2023-10-12 PROCEDURE — 81003 URINALYSIS AUTO W/O SCOPE: CPT | Performed by: EMERGENCY MEDICINE

## 2023-10-12 PROCEDURE — 85025 COMPLETE CBC W/AUTO DIFF WBC: CPT | Performed by: EMERGENCY MEDICINE

## 2023-10-12 NOTE — ED PROVIDER NOTES
Pt Name: Rebel Sloan  MRN: [id number]  [address] 1990  Age/Sex: 35 y.o. female  Date of evaluation: 10/12/2023  PCP: Angelica Marin MD    [address]    Chief Complaint   Patient presents with    Rapid Heart Rate     Pt reports she been having a rapid heart rate, leg tremors, shaking, and anxiety x 2 hrs. Pt is 30 weeks pregnant. Had Tdap vaccine yesterday. HPI and MDM    35 y.o. female presenting with palpitations, shaking, tremors, anxiety started about an hour or so ago. Patient states it is slightly better than when it first started. She is 30 weeks pregnant. She got her Tdap vaccine yesterday. No chest pain or shortness of breath, no nausea or vomiting, no fevers or chills. No known sick contacts. No abdominal pain. No vaginal bleeding. No urinary symptoms. This is her first pregnancy. She states she has a history of panic attacks and this feels similar. Differential diagnosis considered includes but not limited to anxiety, arrhythmia, electrolyte abnormalities, dehydration. Per my independent interpretation of EKG, normal sinus rhythm heart of 81, narrow QRS, intervals reassuring, no STEMI. ED Course as of 10/12/23 2033   Thu Oct 12, 2023   1836 Upon bedside ultrasound, there is positive fetal movement,  bpm.       UA reassuring, mild hyponatremia, patient advised to increase her salt intake. Is feeling better upon reevaluation. Advised PCP and OB/GYN follow-up, return precautions celso, patient updated with results, she verbalized understanding and is in agreement with plan. Medications - No data to display      Past Medical and Surgical History    Past Medical History:   Diagnosis Date    PCOS (polycystic ovarian syndrome)     dx in early 20's    Varicella     had chicken pox       History reviewed. No pertinent surgical history.     Family History   Problem Relation Age of Onset    Hypertension Mother     Other Father         spinal issues    Arthritis Sister     No Known Problems Brother     No Known Problems Half-Brother        Social History     Tobacco Use    Smoking status: Former     Types: Cigarettes    Smokeless tobacco: Never   Vaping Use    Vaping Use: Never used   Substance Use Topics    Alcohol use: Not Currently    Drug use: Never           Allergies    No Known Allergies    Home Medications    Prior to Admission medications    Medication Sig Start Date End Date Taking? Authorizing Provider   cetirizine (ZyrTEC) 10 mg tablet Take 10 mg by mouth daily    Historical Provider, MD   fluticasone (FLONASE) 50 mcg/act nasal spray 1 spray into each nostril daily    Historical Provider, MD   MAGNESIUM GLYCINATE PO Take by mouth    Historical Provider, MD   Prenatal Vit-Fe Fumarate-FA (PRENATAL VITAMIN PO) Take by mouth    Historical Provider, MD           Physical Exam      ED Triage Vitals   Temperature Pulse Respirations Blood Pressure SpO2   10/12/23 1753 10/12/23 1753 10/12/23 1753 10/12/23 1753 10/12/23 1753   (!) 97.3 °F (36.3 °C) 97 18 155/70 100 %      Temp src Heart Rate Source Patient Position - Orthostatic VS BP Location FiO2 (%)   -- 10/12/23 1753 10/12/23 1900 10/12/23 1900 --    Monitor Lying Right arm       Pain Score       --                      Physical Exam  Constitutional:       General: She is not in acute distress. Appearance: She is not ill-appearing. HENT:      Head: Normocephalic and atraumatic. Nose: Nose normal.      Mouth/Throat:      Mouth: Mucous membranes are moist.   Eyes:      Extraocular Movements: Extraocular movements intact. Pupils: Pupils are equal, round, and reactive to light. Cardiovascular:      Rate and Rhythm: Normal rate and regular rhythm. Pulmonary:      Effort: No respiratory distress. Breath sounds: Normal breath sounds. No wheezing. Abdominal:      Palpations: Abdomen is soft. Tenderness: There is no abdominal tenderness.    Musculoskeletal:         General: No swelling or deformity. Normal range of motion. Cervical back: Normal range of motion and neck supple. Skin:     General: Skin is warm. Findings: No erythema. Neurological:      Mental Status: She is alert and oriented to person, place, and time. Mental status is at baseline.    Psychiatric:         Mood and Affect: Mood normal.         Behavior: Behavior normal.              Diagnostic Results      Labs:    Results Reviewed       Procedure Component Value Units Date/Time    Comprehensive metabolic panel [[id number]]  (Abnormal) Collected: 10/12/23 1839    Lab Status: Final result Specimen: Blood from Arm, Left Updated: 10/12/23 1901     Sodium 134 mmol/L      Potassium 3.6 mmol/L      Chloride 103 mmol/L      CO2 24 mmol/L      ANION GAP 7 mmol/L      BUN 6 mg/dL      Creatinine 0.47 mg/dL      Glucose 92 mg/dL      Calcium 8.8 mg/dL      AST 26 U/L      ALT 29 U/L      Alkaline Phosphatase 62 U/L      Total Protein 6.7 g/dL      Albumin 3.8 g/dL      Total Bilirubin 0.96 mg/dL      eGFR 130 ml/min/1.73sq m     Narrative:      [place] guidelines for Chronic Kidney Disease (CKD):     Stage 1 with normal or high GFR (GFR > 90 mL/min/1.73 square meters)    Stage 2 Mild CKD (GFR = 60-89 mL/min/1.73 square meters)    Stage 3A Moderate CKD (GFR = 45-59 mL/min/1.73 square meters)    Stage 3B Moderate CKD (GFR = 30-44 mL/min/1.73 square meters)    Stage 4 Severe CKD (GFR = 15-29 mL/min/1.73 square meters)    Stage 5 End Stage CKD (GFR <15 mL/min/1.73 square meters)  Note: GFR calculation is accurate only with a steady state creatinine    Magnesium [[id number]]  (Normal) Collected: 10/12/23 1839    Lab Status: Final result Specimen: Blood from Arm, Left Updated: 10/12/23 1901     Magnesium 1.9 mg/dL     UA (URINE) with reflex to Scope [[id number]]  (Abnormal) Collected: 10/12/23 1839    Lab Status: Final result Specimen: Urine, Clean Catch Updated: 10/12/23 1846     Color, UA Colorless     Clarity, UA Clear     Specific Gravity, UA 1.001     pH, UA 6.5     Leukocytes, UA Negative     Nitrite, UA Negative     Protein, UA Negative mg/dl      Glucose, UA Negative mg/dl      Ketones, UA Negative mg/dl      Urobilinogen, UA <2.0 mg/dl      Bilirubin, UA Negative     Occult Blood, UA Negative    CBC and differential [[id number]]  (Abnormal) Collected: 10/12/23 1839    Lab Status: Final result Specimen: Blood from Arm, Left Updated: 10/12/23 1844     WBC 12.61 Thousand/uL      RBC 4.12 Million/uL      Hemoglobin 13.2 g/dL      Hematocrit 38.6 %      MCV 94 fL      MCH 32.0 pg      MCHC 34.2 g/dL      RDW 13.0 %      MPV 9.4 fL      Platelets 944 Thousands/uL      nRBC 0 /100 WBCs      Neutrophils Relative 78 %      Immat GRANS % 0 %      Lymphocytes Relative 16 %      Monocytes Relative 5 %      Eosinophils Relative 1 %      Basophils Relative 0 %      Neutrophils Absolute 9.75 Thousands/µL      Immature Grans Absolute 0.05 Thousand/uL      Lymphocytes Absolute 2.06 Thousands/µL      Monocytes Absolute 0.62 Thousand/µL      Eosinophils Absolute 0.09 Thousand/µL      Basophils Absolute 0.04 Thousands/µL             All labs reviewed and utilized in the medical decision making process    Radiology:    No orders to display       All radiology studies independently viewed by me and interpreted by the radiologist.    Procedure    Procedures        FINAL IMPRESSION    Final diagnoses:   Palpitations         DISPOSITION    Time reflects when diagnosis was documented in both MDM as applicable and the Disposition within this note       Time User Action Codes Description Comment    10/12/2023  8:17 PM Erika Lu Add [R00.2] Palpitations           ED Disposition       ED Disposition   Discharge    Condition   Stable    Date/Time   Thu Oct 12, 2023  8:17 PM    Comment   Sis Hanson discharge to home/self care.                    Follow-up Information       Follow up With Specialties Details Why [address] [place] [address] MD [facility] Call in 1 day  [address] [place] [address]  [phone]                PATIENT REFERRED TO:    Octavia Blanton MD  [place] [address] [address]  [phone]    Call in 1 day        DISCHARGE MEDICATIONS:    Patient's Medications   Discharge Prescriptions    No medications on file       No discharge procedures on file. Margaret Brown DO        This note was partially completed using voice recognition technology, and was scanned for gross errors; however some errors may still exist. Please contact the author with any questions or requests for clarification.       Margaret Brown DO  10/12/23 2035

## 2023-10-12 NOTE — TELEPHONE ENCOUNTER
Patient called- stated that she was here for an appointment yesterday and had the Tdap vaccine. Besides for her arm being sore, she had no other side effects. Tonight, about 45 min ago, she noticed increased anxiety, her heart was racing, and has minor leg spasms. Reviewed with - patient should go to the ER for an evaluation. Miya Corado was agreeable. Will head to [facility] ED since she lives in close proximity to that ER.

## 2023-10-13 LAB
ATRIAL RATE: 81 BPM
P AXIS: 49 DEGREES
PR INTERVAL: 152 MS
QRS AXIS: 97 DEGREES
QRSD INTERVAL: 92 MS
QT INTERVAL: 374 MS
QTC INTERVAL: 434 MS
T WAVE AXIS: 7 DEGREES
VENTRICULAR RATE: 81 BPM

## 2023-10-13 PROCEDURE — 93010 ELECTROCARDIOGRAM REPORT: CPT | Performed by: INTERNAL MEDICINE

## 2023-10-13 NOTE — DISCHARGE INSTRUCTIONS
Follow-up with your OB/GYN doctor. Return to the emergency department for any new or worsening symptoms.

## 2023-10-14 ENCOUNTER — CLINICAL SUPPORT (OUTPATIENT)
Age: 33
End: 2023-10-14

## 2023-10-14 DIAGNOSIS — Z32.2 ENCOUNTER FOR CHILDBIRTH INSTRUCTION: Primary | ICD-10-CM

## 2023-10-14 NOTE — PROGRESS NOTES
[facility]

## 2023-10-16 ENCOUNTER — APPOINTMENT (OUTPATIENT)
Dept: PHYSICAL THERAPY | Facility: CLINIC | Age: 33
End: 2023-10-16
Payer: COMMERCIAL

## 2023-10-17 ENCOUNTER — TELEPHONE (OUTPATIENT)
Facility: HOSPITAL | Age: 33
End: 2023-10-17

## 2023-10-17 NOTE — TELEPHONE ENCOUNTER
I called Tabitha's partner Jovany to discuss his [employer] comprehensive carrier screen results. 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 Briefly reviewed the morbidity and mortality associated with them. Also discussed the autosomal recessive inheritance pattern, thus if Alison Xiao is also a carrier there is a 25% chance for the pregnancy to be affected. We reviewed that Tabitha's test is expected to result by 10/21/23. Jovany expressed verbal understanding and had no further questions.      Abran Ellis, [address] [facility]

## 2023-10-23 ENCOUNTER — OFFICE VISIT (OUTPATIENT)
Dept: PHYSICAL THERAPY | Facility: CLINIC | Age: 33
End: 2023-10-23
Payer: COMMERCIAL

## 2023-10-23 DIAGNOSIS — M54.32 BILATERAL SCIATICA: ICD-10-CM

## 2023-10-23 DIAGNOSIS — M54.31 BILATERAL SCIATICA: ICD-10-CM

## 2023-10-23 DIAGNOSIS — Z3A.28 28 WEEKS GESTATION OF PREGNANCY: ICD-10-CM

## 2023-10-23 DIAGNOSIS — M51.26 LUMBAR DISC HERNIATION: ICD-10-CM

## 2023-10-23 DIAGNOSIS — M21.70 LEG LENGTH DISCREPANCY: Primary | ICD-10-CM

## 2023-10-23 PROCEDURE — 97110 THERAPEUTIC EXERCISES: CPT

## 2023-10-23 NOTE — PROGRESS NOTES
Daily Note     Today's date: 10/23/2023  Patient name: Joce Rick  : 1990  MRN: [id number]  Referring provider: Mikaela Dillon MD  Dx:   Encounter Diagnosis     ICD-10-CM    1. Leg length discrepancy  M21.70       2. Lumbar disc herniation  M51.26       3. Bilateral sciatica  M54.31     M54.32       4. 28 weeks gestation of pregnancy  Z3A.28                      Subjective: Pt reports she has been having R posterior hip pain since last week. Objective: See treatment diary below      Assessment: Difficulty with STS transfers at this time. Pt demonstrates decreased pelvic stabilization in quadruped. She finds sx relief with charted interventions performed today. Pt would benefit from continued PT in order to improve pelvic stabilization for improved function during daily activities. Plan: Continue per plan of care. Precautions: Pregnant (due 23); true leg length discrepancy     [email or web address]  Access Code: [email or web address]        POC expires Unit limit Auth Expiration date PT/OT + Visit Limit?   23 BOMN n/a BOMN                           Visit/Unit Tracking  AUTH Status:  Date 10/2 10/9 10/26            Required after 24th visit  Used 1 2 3             Remaining  23 22 21                  Manuals 10/2 10/9  10/26                                                              Neuro Re-Ed             Adductor set 3"X20   YMB 5"x20          HL hip abd  Red 2x10 ea            Bridge HEP instruction  HEP review            Seated PB march & LAQ   2x10 ea  2x10 ea          SL clamshell   10"X10 ea            Paloff press   NV RTB x20          Pelvic tilts on PB   3"x20          Q-ped rockers   10"x10          Bird dog   2x10          Ther Ex             Pt Edu  KS  AF          Heel lift- L  KS             Recumbent bike NV 5 min 5 min          Standing hip abd/ext Red 2x10 ea  Red 2x10 ea            HS stretch w strap  L 5x20"             Side step w band   Red x 4 at rail Seated piriformis stretch   3x20" ea  30"x3 ea                                    Ther Activity                                       Gait Training                                       Modalities

## 2023-10-24 ENCOUNTER — ROUTINE PRENATAL (OUTPATIENT)
Dept: OBGYN CLINIC | Facility: CLINIC | Age: 33
End: 2023-10-24
Payer: COMMERCIAL

## 2023-10-24 VITALS — BODY MASS INDEX: 25.85 KG/M2 | WEIGHT: 170 LBS | DIASTOLIC BLOOD PRESSURE: 62 MMHG | SYSTOLIC BLOOD PRESSURE: 108 MMHG

## 2023-10-24 DIAGNOSIS — Z67.91 RH NEGATIVE STATE IN ANTEPARTUM PERIOD, THIRD TRIMESTER: ICD-10-CM

## 2023-10-24 DIAGNOSIS — Z3A.32 32 WEEKS GESTATION OF PREGNANCY: Primary | ICD-10-CM

## 2023-10-24 DIAGNOSIS — O26.893 RH NEGATIVE STATE IN ANTEPARTUM PERIOD, THIRD TRIMESTER: ICD-10-CM

## 2023-10-24 PROCEDURE — 99213 OFFICE O/P EST LOW 20 MIN: CPT | Performed by: OBSTETRICS & GYNECOLOGY

## 2023-10-24 NOTE — PROGRESS NOTES
35 y.o.  female at [address] [facility] (Estimated Date of Delivery: 23) for PNV. She is doing well, no concerns. Pre- Vitals      Flowsheet Row Most Recent Value   Prenatal Assessment    Fetal Heart Rate 150   Fundal Height (cm) 31 cm   Movement Present   Prenatal Vitals    Blood Pressure 108/62   Weight - Scale 77.1 kg (170 lb)   Urine Albumin/Glucose    Dilation/Effacement/Station    Vaginal Drainage    Edema         TW.979 kg (22 lb)    Leakage of fluid: no  Vaginal bleeding: no  Contractions/Cramping: no  Fetal movement: yes    It's a GIRL! RTO in 2 weeks.

## 2023-10-25 ENCOUNTER — TELEPHONE (OUTPATIENT)
Facility: HOSPITAL | Age: 33
End: 2023-10-25

## 2023-10-25 NOTE — TELEPHONE ENCOUNTER
I called Keshav Avalos at [phone] to discuss her [employer] comprehensive carrier screen results. 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 I confirmed this and that their baby is not expected to be at increased risk for any of the tested conditions. We discussed the finding of pseudodeficiency allele in the [address] gene, associated with Krabbe disease. We reviewed that while this variant is not expected to cause disease, it can produce a false positive result on an enzyme activity level test, such as may be used in  screening. Keshav Avalos was happy to know about this potential issue in advance. I discussed that Ebonie Cavazos is supposed to provide a reproductive risk summary document combining two partners' reports, but that due to her initial test failure and redraw the two orders are no longer linked. I am trying to rectify this issue and will message the patient when it is resolved. Keshav Avalos also stated that she and her  Jovany are not able to view their test reports online in the [employer] portal, and asked that I look into that as well. I stated I would do so and reach out when I have more information. The patient verbalized her understanding and had no further questions at this time.     Dasha Lopez, [address] [facility]

## 2023-10-30 ENCOUNTER — OFFICE VISIT (OUTPATIENT)
Dept: PHYSICAL THERAPY | Facility: CLINIC | Age: 33
End: 2023-10-30
Payer: COMMERCIAL

## 2023-10-30 DIAGNOSIS — M54.31 BILATERAL SCIATICA: ICD-10-CM

## 2023-10-30 DIAGNOSIS — M21.70 LEG LENGTH DISCREPANCY: Primary | ICD-10-CM

## 2023-10-30 DIAGNOSIS — M51.26 LUMBAR DISC HERNIATION: ICD-10-CM

## 2023-10-30 DIAGNOSIS — M54.32 BILATERAL SCIATICA: ICD-10-CM

## 2023-10-30 DIAGNOSIS — Z3A.28 28 WEEKS GESTATION OF PREGNANCY: ICD-10-CM

## 2023-10-30 PROBLEM — Z13.79 ASHKENAZI JEWISH ANCESTRY REQUIRING POPULATION-SPECIFIC GENETIC SCREENING: Status: RESOLVED | Noted: 2023-08-31 | Resolved: 2023-10-30

## 2023-10-30 PROCEDURE — 97110 THERAPEUTIC EXERCISES: CPT

## 2023-10-30 PROCEDURE — 97112 NEUROMUSCULAR REEDUCATION: CPT

## 2023-10-30 NOTE — PROGRESS NOTES
Daily Note     Today's date: 10/30/2023  Patient name: Nithin Whitfield  : 1990  MRN: [id number]  Referring provider: Megan Miller MD  Dx:   Encounter Diagnosis     ICD-10-CM    1. Leg length discrepancy  M21.70       2. Lumbar disc herniation  M51.26       3. Bilateral sciatica  M54.31     M54.32       4. 28 weeks gestation of pregnancy  Z3A.28                      Subjective: patient reports she felt better after last session. She states she did a lot of walking yesterday and has some irritation in R hip flexor. Objective: See treatment diary below      Assessment: Pt tolerates treatment well. She performs charted interventions without complications. Demonstrates decreased trunk control while on PB, close supervision provided for safety. Plan: Continue per plan of care. Precautions: Pregnant (due 23); true leg length discrepancy     [email or web address]  Access Code: [email or web address]        POC expires Unit limit Auth Expiration date PT/OT + Visit Limit?   23 BOMN n/a BOMN                           Visit/Unit Tracking  AUTH Status:  Date 10/2 10/9 10/26 10/30           Required after 24th visit  Used 1 2 3 4            Remaining  23 22 21 20                 Manuals 10/2 10/9  10/26 10/30                                                             Neuro Re-Ed             Adductor set 3"X20   YMB 5"x20 YMB 5"x20         HL hip abd  Red 2x10 ea   Alt BTB 2x10         Bridge HEP instruction  HEP review            Seated PB march & LAQ   2x10 ea  2x10 ea X30 ea         SL clamshell   10"X10 ea            Paloff press   NV RTB x20 RTBx20         Pelvic tilts on PB   3"x20 3"x30         Q-ped rockers   10"x10 10"x10         Bird dog   2x10 2x10         Add bridge    YMB 2x12         Ther Ex             Pt Edu  KS  AF AF         Heel lift- L  KS             Recumbent bike NV 5 min 5 min 5 min         Standing hip abd/ext Red 2x10 ea  Red 2x10 ea            HS stretch w strap  L 5x20" Side step w band   Red x 4 at rail            Seated piriformis stretch   3x20" ea  30"x3 ea 30"x3 ea                                   Ther Activity                                       Gait Training                                       Modalities

## 2023-11-06 ENCOUNTER — ROUTINE PRENATAL (OUTPATIENT)
Dept: OBGYN CLINIC | Facility: CLINIC | Age: 33
End: 2023-11-06
Payer: COMMERCIAL

## 2023-11-06 VITALS — WEIGHT: 173 LBS | SYSTOLIC BLOOD PRESSURE: 102 MMHG | BODY MASS INDEX: 26.3 KG/M2 | DIASTOLIC BLOOD PRESSURE: 62 MMHG

## 2023-11-06 DIAGNOSIS — Z3A.34 34 WEEKS GESTATION OF PREGNANCY: ICD-10-CM

## 2023-11-06 DIAGNOSIS — Z34.83 ENCOUNTER FOR SUPERVISION OF OTHER NORMAL PREGNANCY, THIRD TRIMESTER: Primary | ICD-10-CM

## 2023-11-06 PROBLEM — F41.0 PANIC DISORDER: Status: ACTIVE | Noted: 2023-11-06

## 2023-11-06 PROCEDURE — 99214 OFFICE O/P EST MOD 30 MIN: CPT | Performed by: OBSTETRICS & GYNECOLOGY

## 2023-11-06 NOTE — PROGRESS NOTES
Patient would like to speak to about a few things about the day of delivery. She states that she has been experiencing some BH contractions but denies any fluid leaking or pressure.

## 2023-11-06 NOTE — PROGRESS NOTES
35 y.o.   female at 29 wga for PNV. BP : 102/62. TW  Feeling well.   No complaints  Reviewed PTL precautions and FKCS  Discussed RSV vaccine  Vtx by [address] to do an unmediated delivery - has been doing classes  F/u 2 weeks

## 2023-11-18 ENCOUNTER — CLINICAL SUPPORT (OUTPATIENT)
Age: 33
End: 2023-11-18

## 2023-11-18 DIAGNOSIS — Z32.2 ENCOUNTER FOR CHILDBIRTH INSTRUCTION: Primary | ICD-10-CM

## 2023-11-21 ENCOUNTER — ROUTINE PRENATAL (OUTPATIENT)
Dept: OBGYN CLINIC | Facility: CLINIC | Age: 33
End: 2023-11-21
Payer: COMMERCIAL

## 2023-11-21 VITALS — SYSTOLIC BLOOD PRESSURE: 102 MMHG | WEIGHT: 175.8 LBS | DIASTOLIC BLOOD PRESSURE: 62 MMHG | BODY MASS INDEX: 26.73 KG/M2

## 2023-11-21 DIAGNOSIS — Z34.83 ENCOUNTER FOR SUPERVISION OF OTHER NORMAL PREGNANCY, THIRD TRIMESTER: Primary | ICD-10-CM

## 2023-11-21 DIAGNOSIS — Z3A.36 36 WEEKS GESTATION OF PREGNANCY: ICD-10-CM

## 2023-11-21 PROCEDURE — 99214 OFFICE O/P EST MOD 30 MIN: CPT | Performed by: PHYSICIAN ASSISTANT

## 2023-11-21 PROCEDURE — 87491 CHLMYD TRACH DNA AMP PROBE: CPT | Performed by: PHYSICIAN ASSISTANT

## 2023-11-21 PROCEDURE — 87591 N.GONORRHOEAE DNA AMP PROB: CPT | Performed by: PHYSICIAN ASSISTANT

## 2023-11-21 PROCEDURE — 87150 DNA/RNA AMPLIFIED PROBE: CPT | Performed by: PHYSICIAN ASSISTANT

## 2023-11-21 NOTE — PROGRESS NOTES
Routine preantal 36 weeks gbs and gc. Pt is well, states there are no concerns. Denies vb and lof. [place] occurring.

## 2023-11-21 NOTE — PROGRESS NOTES
Patient is a 36 YO  female presenting to the office at 36.1 weeks for routine OB care.    BP: 102/62  TWlb  Fetal Movement: yes good movement   LOF: no  VB: no  CTX: no  GBS and GC collected  SVE declined  Reviewed precautions  Call for concerns  RTO 1 weeks

## 2023-11-22 LAB
C TRACH DNA SPEC QL NAA+PROBE: NEGATIVE
N GONORRHOEA DNA SPEC QL NAA+PROBE: NEGATIVE

## 2023-11-24 LAB — GP B STREP DNA SPEC QL NAA+PROBE: NEGATIVE

## 2023-11-27 ENCOUNTER — CLINICAL SUPPORT (OUTPATIENT)
Dept: POSTPARTUM | Facility: CLINIC | Age: 33
End: 2023-11-27

## 2023-11-27 DIAGNOSIS — Z32.2 ENCOUNTER FOR CHILDBIRTH INSTRUCTION: Primary | ICD-10-CM

## 2023-11-29 ENCOUNTER — ROUTINE PRENATAL (OUTPATIENT)
Dept: OBGYN CLINIC | Facility: CLINIC | Age: 33
End: 2023-11-29
Payer: COMMERCIAL

## 2023-11-29 VITALS — SYSTOLIC BLOOD PRESSURE: 108 MMHG | WEIGHT: 178 LBS | BODY MASS INDEX: 27.06 KG/M2 | DIASTOLIC BLOOD PRESSURE: 64 MMHG

## 2023-11-29 DIAGNOSIS — Z67.91 RH NEGATIVE STATE IN ANTEPARTUM PERIOD, THIRD TRIMESTER: Primary | ICD-10-CM

## 2023-11-29 DIAGNOSIS — Z3A.37 37 WEEKS GESTATION OF PREGNANCY: ICD-10-CM

## 2023-11-29 DIAGNOSIS — O26.893 RH NEGATIVE STATE IN ANTEPARTUM PERIOD, THIRD TRIMESTER: Primary | ICD-10-CM

## 2023-11-29 PROCEDURE — 99213 OFFICE O/P EST LOW 20 MIN: CPT | Performed by: OBSTETRICS & GYNECOLOGY

## 2023-11-29 NOTE — PROGRESS NOTES
Pt is tired but doing well. She is having some contractions and pressure but denies any bleeding or LOF. She declines an exam today. Neg urine dip.

## 2023-11-29 NOTE — PROGRESS NOTES
35 y.o.  female at 43w1d (Estimated Date of Delivery: 23) for PNV. Pre- Vitals      Flowsheet Row Most Recent Value   Prenatal Assessment    Fetal Heart Rate 140   Movement Present   Prenatal Vitals    Blood Pressure 108/64   Weight - Scale 80.7 kg (178 lb)   Urine Albumin/Glucose    Dilation/Effacement/Station    Vaginal Drainage    Edema           TW.6 kg (30 lb)    Leakage of fluid: no  Vaginal bleeding: no  Contractions/Cramping: yes  Fetal movement: yes  GBS negative. Labor precautions reviewed. Encouraged continued [phone] RTO in 1 weeks.

## 2023-12-05 ENCOUNTER — ROUTINE PRENATAL (OUTPATIENT)
Dept: OBGYN CLINIC | Facility: CLINIC | Age: 33
End: 2023-12-05
Payer: COMMERCIAL

## 2023-12-05 VITALS — BODY MASS INDEX: 27.31 KG/M2 | SYSTOLIC BLOOD PRESSURE: 104 MMHG | WEIGHT: 179.6 LBS | DIASTOLIC BLOOD PRESSURE: 62 MMHG

## 2023-12-05 DIAGNOSIS — Z3A.38 38 WEEKS GESTATION OF PREGNANCY: ICD-10-CM

## 2023-12-05 DIAGNOSIS — Z34.83 ENCOUNTER FOR SUPERVISION OF OTHER NORMAL PREGNANCY, THIRD TRIMESTER: Primary | ICD-10-CM

## 2023-12-05 PROCEDURE — 99215 OFFICE O/P EST HI 40 MIN: CPT | Performed by: OBSTETRICS & GYNECOLOGY

## 2023-12-05 NOTE — PROGRESS NOTES
35 y.o.  female at 38w1d (Estimated Date of Delivery: 23) for PNV. Pre- Vitals      Flowsheet Row Most Recent Value   Prenatal Assessment    Movement Present   Prenatal Vitals    Blood Pressure 104/62   Weight - Scale 81.5 kg (179 lb 9.6 oz)   Urine Albumin/Glucose    Dilation/Effacement/Station    Vaginal Drainage    Edema           TW.3 kg (31 lb 9.6 oz)  Doing well 3rd tri  Reviewed labor precautions  Signed birth plan  Would like to await spontaneous labor up to 41-42 weeks.

## 2023-12-14 ENCOUNTER — ROUTINE PRENATAL (OUTPATIENT)
Dept: OBGYN CLINIC | Facility: CLINIC | Age: 33
End: 2023-12-14
Payer: COMMERCIAL

## 2023-12-14 VITALS — SYSTOLIC BLOOD PRESSURE: 114 MMHG | DIASTOLIC BLOOD PRESSURE: 72 MMHG | WEIGHT: 180 LBS | BODY MASS INDEX: 27.37 KG/M2

## 2023-12-14 DIAGNOSIS — Z34.83 ENCOUNTER FOR SUPERVISION OF OTHER NORMAL PREGNANCY, THIRD TRIMESTER: Primary | ICD-10-CM

## 2023-12-14 PROCEDURE — 99213 OFFICE O/P EST LOW 20 MIN: CPT | Performed by: OBSTETRICS & GYNECOLOGY

## 2023-12-14 NOTE — PROGRESS NOTES
Pt is here for her 39w prenatal. Pt denies any leakage,bleeding. Pt has some pressure, and contractions.  Pt declines cervical exam.

## 2023-12-14 NOTE — PATIENT INSTRUCTIONS
Am I in labour? As you enter the final stages of your pregnancy, your body will give signs that labour is approaching. The following information should help you to understand these signs and make it easier for you to determine whether you are in labour. Some of the signs and symptoms of going into labour may include:    period-like cramps  backache  diarrhoea  mucous discharge or ‘show’  gush or trickle of water as the membranes break  contractions  Engagement  As you move closer to delivery, your baby’s head may drop and become engaged in your pelvis in preparation for labour. If you are expecting your first baby, you may notice pressure in your groin and on your bladder beginning up to four weeks before the birth. You may also notice that you can breathe a little easier and have a little more appetite as your baby drops, and is not pushed up against your diaphragm and stomach quite so much. This is sometimes known as “lightening”, as women generally feel lighter. Show    During your pregnancy a mucous plug fills your cervix. Towards the end of pregnancy, the cervix becomes softer and this mucous plug may become loosened and start to come away. The process of discharging this mucous is called a ‘show’ and might often contain streaks of blood or may also be brownish in colour. This is different from any flow of fresh blood which you would report immediately to your doctor or the hospital. The show may continue over a period of hours or even days. It is one of the signs that your body is preparing for birth. Labour may begin in the next few days, hours or weeks following a show. There is no need to phone the hospital if you have had a show. Water breaking (rupture of membranes)    This may occur at any time prior to the start of labour, or at any time during labour. The break may be low, near the opening of the uterus, and will produce a gush of amniotic fluid.  If this occurs, place a sanitary pad on and note the colour of the fluid. Ring the hospital and tell the midwife that this has occurred. You will usually be asked to come in to the hospital.    Another type of amniotic fluid leak may occur higher up in the amniotic sack, or top of the uterus. This will be less obvious to you and you may only notice a trickle of fluid. Since many women have a heavy vaginal discharge or leak a small amount of urine towards the end of their pregnancy and it is often difficult to tell the difference between urine and amniotic fluid - urine is often yellow; where amniotic fluid is usually clear, or has a pink tinge, and has a "sweet" odour. If you are unsure, ring the hospital.    If the colour of the fluid is green or brownish, it indicates that your baby has passed a bowel motion (meconium) inside the uterus. It is very common to have meconium-stained amniotic fluid in a pregnancy over 41 weeks, but this may also be a sign that your baby is distressed. You will need to ring the hospital immediately and then come into the hospital.    Contractions    [place] contractions  [place] contractions are sometimes mistaken for labour. These “practice” contractions usually start [facility] through the pregnancy and continue right through to the end. These contractions are often irregular and can be uncomfortable and tight. [place] contractions usually increase in regularity and strength towards the end of your pregnancy, preparing your uterus for the birth. Sometimes it is difficult to distinguish between these [facility] contractions and labour contractions. Below are the common differences between the two. Labour contractions  True labour contractions usually increase in strength and duration. In order to time your contractions, time the interval between the start of one contraction to the start of the next. Early labour contractions are often likened to period cramps with or without backache.     [facility] contractions    Labour contractions    usually irregular and short    become regular with time    do not get closer together    get closer together with time    do not get stronger     become stronger    walking does not make them stronger  walking can make them stronger    lying down may make them go away   lying down does not make them go away    uncomfortable and tight--not painful   Painful - can't walk, talk or breathe through them        How does labour start? Labour can start in different ways. You may be start experiencing some period like pains or contractions. You might notice that these tightenings/contractions start to get stronger, closer and last longer than before. Or you might start with some back ache or a stomach upset that gets stronger and develops into regular contractions. In approximately 10-15% of women, labour will start when your membranes rupture. Contractions usually follow. Should I call my doctor?     You should call your doctor at [phone] when:    - your chavez break  - you have bright blood loss  - your contractions are regular and five minutes apart  - if you have decreased fetal movement

## 2023-12-14 NOTE — PROGRESS NOTES
35 y.o.  female at 39w3d (Estimated Date of Delivery: 23) for PNV. Pre- Vitals      Flowsheet Row Most Recent Value   Prenatal Assessment    Fetal Heart Rate 155   Movement Present   Prenatal Vitals    Blood Pressure 114/72   Weight - Scale 81.6 kg (180 lb)   Urine Albumin/Glucose    Dilation/Effacement/Station    Vaginal Drainage    Edema           TW.5 kg (32 lb)    Leakage of fluid: no  Vaginal bleeding: no  Contractions/Cramping: no  Fetal movement: yes  Planning for unmedicated/no epidural during labor -- has done birthing classes  Planned cord blood banking. Reminded to bring kit to hospital.   SVE declined  Discussed if still pregnant next week will be discussing IOL. RTO in 1 weeks.

## 2023-12-18 LAB
DME PARACHUTE DELIVERY DATE ACTUAL: NORMAL
DME PARACHUTE DELIVERY DATE REQUESTED: NORMAL
DME PARACHUTE ITEM DESCRIPTION: NORMAL
DME PARACHUTE ORDER STATUS: NORMAL
DME PARACHUTE SUPPLIER NAME: NORMAL
DME PARACHUTE SUPPLIER PHONE: NORMAL

## 2023-12-20 ENCOUNTER — ROUTINE PRENATAL (OUTPATIENT)
Dept: OBGYN CLINIC | Facility: CLINIC | Age: 33
End: 2023-12-20
Payer: COMMERCIAL

## 2023-12-20 VITALS — BODY MASS INDEX: 27.52 KG/M2 | WEIGHT: 181 LBS | SYSTOLIC BLOOD PRESSURE: 110 MMHG | DIASTOLIC BLOOD PRESSURE: 62 MMHG

## 2023-12-20 DIAGNOSIS — O26.893 RH NEGATIVE STATE IN ANTEPARTUM PERIOD, THIRD TRIMESTER: Primary | ICD-10-CM

## 2023-12-20 DIAGNOSIS — Z67.91 RH NEGATIVE STATE IN ANTEPARTUM PERIOD, THIRD TRIMESTER: Primary | ICD-10-CM

## 2023-12-20 DIAGNOSIS — Z3A.40 40 WEEKS GESTATION OF PREGNANCY: ICD-10-CM

## 2023-12-20 PROCEDURE — 99213 OFFICE O/P EST LOW 20 MIN: CPT

## 2023-12-20 NOTE — PROGRESS NOTES
Patient is a 34 YO  female presenting to the office at 40w2d for routine OB care.   Patient is feeling well today. No concerns or questions. Will schedule IOL for next week.   Cervical Exam:   Fetal heart rate: 145 bpm  BP: 110/62  TWlb  Fetal Movement: yes  LOF: no  VB: no  CTX: irregular, sporadic contractions. Pt feels they are getting more frequent.   Reviewed precautions  Call for concerns

## 2023-12-27 ENCOUNTER — HOSPITAL ENCOUNTER (OUTPATIENT)
Dept: LABOR AND DELIVERY | Facility: HOSPITAL | Age: 33
Discharge: HOME | DRG: 560 | End: 2023-12-27
Payer: COMMERCIAL

## 2023-12-27 ENCOUNTER — HOSPITAL ENCOUNTER (INPATIENT)
Facility: HOSPITAL | Age: 33
LOS: 3 days | Discharge: HOME | DRG: 560 | End: 2023-12-30
Attending: OBSTETRICS & GYNECOLOGY | Admitting: OBSTETRICS & GYNECOLOGY
Payer: COMMERCIAL

## 2023-12-27 DIAGNOSIS — Z34.83 ENCOUNTER FOR SUPERVISION OF OTHER NORMAL PREGNANCY, THIRD TRIMESTER: Primary | ICD-10-CM

## 2023-12-27 PROBLEM — Z34.90 ENCOUNTER FOR INDUCTION OF LABOR: Status: ACTIVE | Noted: 2023-12-27

## 2023-12-27 LAB
ERYTHROCYTE [DISTWIDTH] IN BLOOD BY AUTOMATED COUNT: 13.4 % (ref 11.6–15.1)
HCT VFR BLD AUTO: 43 % (ref 34.8–46.1)
HGB BLD-MCNC: 14.4 G/DL (ref 11.5–15.4)
MCH RBC QN AUTO: 32 PG (ref 26.8–34.3)
MCHC RBC AUTO-ENTMCNC: 33.5 G/DL (ref 31.4–37.4)
MCV RBC AUTO: 96 FL (ref 82–98)
PLATELET # BLD AUTO: 185 THOUSANDS/UL (ref 149–390)
PMV BLD AUTO: 10.2 FL (ref 8.9–12.7)
RBC # BLD AUTO: 4.5 MILLION/UL (ref 3.81–5.12)
WBC # BLD AUTO: 11.5 THOUSAND/UL (ref 4.31–10.16)

## 2023-12-27 PROCEDURE — 86900 BLOOD TYPING SEROLOGIC ABO: CPT

## 2023-12-27 PROCEDURE — 86850 RBC ANTIBODY SCREEN: CPT

## 2023-12-27 PROCEDURE — 4A1HXCZ MONITORING OF PRODUCTS OF CONCEPTION, CARDIAC RATE, EXTERNAL APPROACH: ICD-10-PCS | Performed by: OBSTETRICS & GYNECOLOGY

## 2023-12-27 PROCEDURE — 85027 COMPLETE CBC AUTOMATED: CPT

## 2023-12-27 PROCEDURE — NC001 PR NO CHARGE: Performed by: OBSTETRICS & GYNECOLOGY

## 2023-12-27 PROCEDURE — 86780 TREPONEMA PALLIDUM: CPT

## 2023-12-27 PROCEDURE — 86901 BLOOD TYPING SEROLOGIC RH(D): CPT

## 2023-12-27 PROCEDURE — 4A0HXCZ MEASUREMENT OF PRODUCTS OF CONCEPTION, CARDIAC RATE, EXTERNAL APPROACH: ICD-10-PCS | Performed by: OBSTETRICS & GYNECOLOGY

## 2023-12-27 RX ORDER — BUPIVACAINE HYDROCHLORIDE 2.5 MG/ML
30 INJECTION, SOLUTION EPIDURAL; INFILTRATION; INTRACAUDAL ONCE AS NEEDED
Status: DISCONTINUED | OUTPATIENT
Start: 2023-12-27 | End: 2023-12-28

## 2023-12-27 RX ORDER — SODIUM CHLORIDE, SODIUM LACTATE, POTASSIUM CHLORIDE, CALCIUM CHLORIDE 600; 310; 30; 20 MG/100ML; MG/100ML; MG/100ML; MG/100ML
125 INJECTION, SOLUTION INTRAVENOUS CONTINUOUS
Status: DISCONTINUED | OUTPATIENT
Start: 2023-12-27 | End: 2023-12-28

## 2023-12-27 RX ORDER — ONDANSETRON 2 MG/ML
4 INJECTION INTRAMUSCULAR; INTRAVENOUS EVERY 6 HOURS PRN
Status: DISCONTINUED | OUTPATIENT
Start: 2023-12-27 | End: 2023-12-28

## 2023-12-28 LAB
ABO GROUP BLD: NORMAL
BASE EXCESS BLDCOA CALC-SCNC: -2.1 MMOL/L (ref 3–11)
BASE EXCESS BLDCOV CALC-SCNC: -1.9 MMOL/L (ref 1–9)
BLD GP AB SCN SERPL QL: NEGATIVE
HCO3 BLDCOA-SCNC: 24.2 MMOL/L (ref 17.3–27.3)
HCO3 BLDCOV-SCNC: 22.1 MMOL/L (ref 12.2–28.6)
HOLD SPECIMEN: NORMAL
O2 CT VFR BLDCOA CALC: 14.7 ML/DL
OXYHGB MFR BLDCOA: 63.1 %
OXYHGB MFR BLDCOV: 75.2 %
PCO2 BLDCOA: 46.8 MM[HG] (ref 30–60)
PCO2 BLDCOV: 35.8 MM HG (ref 27–43)
PH BLDCOA: 7.33 [PH] (ref 7.23–7.43)
PH BLDCOV: 7.41 [PH] (ref 7.19–7.49)
PO2 BLDCOA: 28.6 MM HG (ref 5–25)
PO2 BLDCOV: 32.7 MM HG (ref 15–45)
RH BLD: POSITIVE
SAO2 % BLDCOV: 17.6 ML/DL
SPECIMEN EXPIRATION DATE: NORMAL
TREPONEMA PALLIDUM IGG+IGM AB [PRESENCE] IN SERUM OR PLASMA BY IMMUNOASSAY: NORMAL

## 2023-12-28 PROCEDURE — 3E0P7VZ INTRODUCTION OF HORMONE INTO FEMALE REPRODUCTIVE, VIA NATURAL OR ARTIFICIAL OPENING: ICD-10-PCS | Performed by: OBSTETRICS & GYNECOLOGY

## 2023-12-28 PROCEDURE — 82805 BLOOD GASES W/O2 SATURATION: CPT | Performed by: OBSTETRICS & GYNECOLOGY

## 2023-12-28 PROCEDURE — 59409 OBSTETRICAL CARE: CPT | Performed by: OBSTETRICS & GYNECOLOGY

## 2023-12-28 PROCEDURE — 0KQM0ZZ REPAIR PERINEUM MUSCLE, OPEN APPROACH: ICD-10-PCS | Performed by: OBSTETRICS & GYNECOLOGY

## 2023-12-28 PROCEDURE — NC001 PR NO CHARGE: Performed by: OBSTETRICS & GYNECOLOGY

## 2023-12-28 RX ORDER — ACETAMINOPHEN 325 MG/1
650 TABLET ORAL EVERY 6 HOURS
Status: DISCONTINUED | OUTPATIENT
Start: 2023-12-28 | End: 2023-12-30 | Stop reason: HOSPADM

## 2023-12-28 RX ORDER — OXYTOCIN/RINGER'S LACTATE 30/500 ML
1-30 PLASTIC BAG, INJECTION (ML) INTRAVENOUS
Status: DISCONTINUED | OUTPATIENT
Start: 2023-12-28 | End: 2023-12-28

## 2023-12-28 RX ORDER — SIMETHICONE 80 MG
80 TABLET,CHEWABLE ORAL 4 TIMES DAILY PRN
Status: DISCONTINUED | OUTPATIENT
Start: 2023-12-28 | End: 2023-12-30 | Stop reason: HOSPADM

## 2023-12-28 RX ORDER — FLUTICASONE PROPIONATE 50 MCG
1 SPRAY, SUSPENSION (ML) NASAL DAILY
Status: DISCONTINUED | OUTPATIENT
Start: 2023-12-29 | End: 2023-12-30 | Stop reason: HOSPADM

## 2023-12-28 RX ORDER — BENZOCAINE/MENTHOL 6 MG-10 MG
1 LOZENGE MUCOUS MEMBRANE DAILY PRN
Status: DISCONTINUED | OUTPATIENT
Start: 2023-12-28 | End: 2023-12-30 | Stop reason: HOSPADM

## 2023-12-28 RX ORDER — ONDANSETRON 2 MG/ML
4 INJECTION INTRAMUSCULAR; INTRAVENOUS EVERY 8 HOURS PRN
Status: DISCONTINUED | OUTPATIENT
Start: 2023-12-28 | End: 2023-12-30 | Stop reason: HOSPADM

## 2023-12-28 RX ORDER — OXYTOCIN/RINGER'S LACTATE 30/500 ML
250 PLASTIC BAG, INJECTION (ML) INTRAVENOUS ONCE
Status: DISCONTINUED | OUTPATIENT
Start: 2023-12-28 | End: 2023-12-30 | Stop reason: HOSPADM

## 2023-12-28 RX ORDER — IBUPROFEN 600 MG/1
600 TABLET ORAL EVERY 6 HOURS
Status: DISCONTINUED | OUTPATIENT
Start: 2023-12-28 | End: 2023-12-30 | Stop reason: HOSPADM

## 2023-12-28 RX ORDER — CALCIUM CARBONATE 500 MG/1
1000 TABLET, CHEWABLE ORAL DAILY PRN
Status: DISCONTINUED | OUTPATIENT
Start: 2023-12-28 | End: 2023-12-30 | Stop reason: HOSPADM

## 2023-12-28 RX ORDER — LIDOCAINE HYDROCHLORIDE 10 MG/ML
INJECTION, SOLUTION EPIDURAL; INFILTRATION; INTRACAUDAL; PERINEURAL
Status: COMPLETED
Start: 2023-12-28 | End: 2023-12-28

## 2023-12-28 RX ADMIN — WITCH HAZEL 1 PAD: 500 SOLUTION RECTAL; TOPICAL at 19:53

## 2023-12-28 RX ADMIN — LIDOCAINE HYDROCHLORIDE: 10 INJECTION, SOLUTION EPIDURAL; INFILTRATION; INTRACAUDAL; PERINEURAL at 18:17

## 2023-12-28 RX ADMIN — SODIUM CHLORIDE, SODIUM LACTATE, POTASSIUM CHLORIDE, AND CALCIUM CHLORIDE 125 ML/HR: .6; .31; .03; .02 INJECTION, SOLUTION INTRAVENOUS at 17:03

## 2023-12-28 RX ADMIN — SODIUM CHLORIDE, SODIUM LACTATE, POTASSIUM CHLORIDE, AND CALCIUM CHLORIDE 125 ML/HR: .6; .31; .03; .02 INJECTION, SOLUTION INTRAVENOUS at 11:34

## 2023-12-28 RX ADMIN — SODIUM CHLORIDE, SODIUM LACTATE, POTASSIUM CHLORIDE, AND CALCIUM CHLORIDE 125 ML/HR: .6; .31; .03; .02 INJECTION, SOLUTION INTRAVENOUS at 18:15

## 2023-12-28 RX ADMIN — Medication 25 MCG: at 00:39

## 2023-12-28 RX ADMIN — Medication 2 MILLI-UNITS/MIN: at 05:27

## 2023-12-28 RX ADMIN — MORPHINE SULFATE 2 MG: 2 INJECTION, SOLUTION INTRAMUSCULAR; INTRAVENOUS at 12:52

## 2023-12-28 RX ADMIN — SODIUM CHLORIDE, SODIUM LACTATE, POTASSIUM CHLORIDE, AND CALCIUM CHLORIDE 999 ML/HR: .6; .31; .03; .02 INJECTION, SOLUTION INTRAVENOUS at 10:17

## 2023-12-28 RX ADMIN — SODIUM CHLORIDE, SODIUM LACTATE, POTASSIUM CHLORIDE, AND CALCIUM CHLORIDE 125 ML/HR: .6; .31; .03; .02 INJECTION, SOLUTION INTRAVENOUS at 05:27

## 2023-12-28 RX ADMIN — IBUPROFEN 600 MG: 600 TABLET ORAL at 19:52

## 2023-12-28 RX ADMIN — BENZOCAINE AND LEVOMENTHOL 1 APPLICATION: 200; 5 SPRAY TOPICAL at 19:53

## 2023-12-28 NOTE — OB LABOR/OXYTOCIN SAFETY PROGRESS
Oxytocin Safety Progress Check Note - Tabitha Jaimes 33 y.o. female MRN: [id number]    Unit/Bed#: -01 Encounter: [id number]    Dose (esdras-units/min) Oxytocin: 8 esdras-units/min  Contraction Frequency (minutes): 1.5-3  Contraction Intensity: Moderate/Strong  Uterine Activity Characteristics: Regular  Cervical Dilation: 7        Cervical Effacement: 90  Fetal Station: -1  Baseline Rate (FHR): 125 bpm  Fetal Heart Rate (FHT): 122 BPM  FHR Category: 1               Vital Signs:   Vitals:    12/28/23 1700   BP: 125/79   Pulse: 78   Resp:    Temp:    SpO2:        Notes/comments:   Patient feeling more uncomfortable. SVE as above. Continue pitocin titration. Consider AROM on next exam. Dr. Yomaira Bergeron MD 12/28/2023 5:45 PM

## 2023-12-28 NOTE — PLAN OF CARE
Problem: BIRTH - VAGINAL/ SECTION  Goal: Fetal and maternal status remain reassuring during the birth process  Description: INTERVENTIONS:  - Monitor vital signs  - Monitor fetal heart rate  - Monitor uterine activity  - Monitor labor progression (vaginal delivery)  - DVT prophylaxis  - Antibiotic prophylaxis  Outcome: Progressing  Goal: Emotionally satisfying birthing experience for mother/fetus  Description: Interventions:  - Assess, plan, implement and evaluate the nursing care given to the patient in labor  - Advocate the philosophy that each childbirth experience is a unique experience and support the family's chosen level of involvement and control during the labor process   - Actively participate in both the patient's and family's teaching of the birth process  - Consider cultural, [place] and age-specific factors and plan care for the patient in labor  Outcome: Progressing     Problem: PAIN - ADULT  Goal: Verbalizes/displays adequate comfort level or baseline comfort level  Description: Interventions:  - Encourage patient to monitor pain and request assistance  - Assess pain using appropriate pain scale  - Administer analgesics based on type and severity of pain and evaluate response  - Implement non-pharmacological measures as appropriate and evaluate response  - Consider cultural and social influences on pain and pain management  - Notify physician/advanced practitioner if interventions unsuccessful or patient reports new pain  Outcome: Progressing     Problem: INFECTION - ADULT  Goal: Absence or prevention of progression during hospitalization  Description: INTERVENTIONS:  - Assess and monitor for signs and symptoms of infection  - Monitor lab/diagnostic results  - Monitor all insertion sites, i.e. indwelling lines, tubes, and drains  - Monitor endotracheal if appropriate and nasal secretions for changes in amount and color  - [place] appropriate cooling/warming therapies per order  -  Administer medications as ordered  - Instruct and encourage patient and family to use good hand hygiene technique  - Identify and instruct in appropriate isolation precautions for identified infection/condition  Outcome: Progressing  Goal: Absence of fever/infection during neutropenic period  Description: INTERVENTIONS:  - Monitor WBC    Outcome: Progressing     Problem: SAFETY ADULT  Goal: Patient will remain free of falls  Description: INTERVENTIONS:  - Educate patient/family on patient safety including physical limitations  - Instruct patient to call for assistance with activity   - Consult OT/PT to assist with strengthening/mobility   - Keep Call bell within reach  - Keep bed low and locked with side rails adjusted as appropriate  - Keep care items and personal belongings within reach  - Initiate and maintain comfort rounds  - Make Fall Risk Sign visible to staff  - Apply yellow socks and bracelet for high fall risk patients  - Consider moving patient to room near nurses station  Outcome: Progressing  Goal: Maintain or return to baseline ADL function  Description: INTERVENTIONS:  -  Assess patient's ability to carry out ADLs; assess patient's baseline for ADL function and identify physical deficits which impact ability to perform ADLs (bathing, care of mouth/teeth, toileting, grooming, dressing, etc.)  - Assess/evaluate cause of self-care deficits   - Assess range of motion  - Assess patient's mobility; develop plan if impaired  - Assess patient's need for assistive devices and provide as appropriate  - Encourage maximum independence but intervene and supervise when necessary  - Involve family in performance of ADLs  - Assess for home care needs following discharge   - Consider OT consult to assist with ADL evaluation and planning for discharge  - Provide patient education as appropriate  Outcome: Progressing  Goal: Maintains/Returns to pre admission functional level  Description: INTERVENTIONS:  - Perform  AM-PAC 6 Click Basic Mobility/ Daily Activity assessment daily.  - Set and communicate daily mobility goal to care team and patient/family/caregiver.   - Collaborate with rehabilitation services on mobility goals if consulted  - Out of bed for toileting  - Record patient progress and toleration of activity level   Outcome: Progressing     Problem: Knowledge Deficit  Goal: Patient/family/caregiver demonstrates understanding of disease process, treatment plan, medications, and discharge instructions  Description: Complete learning assessment and assess knowledge base.  Interventions:  - Provide teaching at level of understanding  - Provide teaching via preferred learning methods  Outcome: Progressing  Goal: Verbalizes understanding of labor plan  Description: Assess patient/family/caregiver's baseline knowledge level and ability to understand information.  Provide education via patient/family/caregiver's preferred learning method at appropriate level of understanding.     1. Provide teaching at level of understanding.  2. Provide teaching via preferred learning method(s).  Outcome: Progressing     Problem: DISCHARGE PLANNING  Goal: Discharge to home or other facility with appropriate resources  Description: INTERVENTIONS:  - Identify barriers to discharge w/patient and caregiver  - Arrange for needed discharge resources and transportation as appropriate  - Identify discharge learning needs (meds, wound care, etc.)  - Arrange for interpretive services to assist at discharge as needed  - Refer to Case Management Department for coordinating discharge planning if the patient needs post-hospital services based on physician/advanced practitioner order or complex needs related to functional status, cognitive ability, or social support system  Outcome: Progressing     Problem: Labor & Delivery  Goal: Manages discomfort  Description: Assess and monitor for signs and symptoms of discomfort.  Assess patient's pain level  regularly and per hospital policy.  Administer medications as ordered. Support use of nonpharmacological methods to help control pain such as distraction, imagery, relaxation, and application of heat and cold.  Collaborate with interdisciplinary team and patient to determine appropriate pain management plan.    1. Include patient in decisions related to comfort.  2. Offer non-pharmacological pain management interventions.  3. Report ineffective pain management to physician.  Outcome: Progressing  Goal: Patient vital signs are stable  Description: 1. Assess vital signs - vaginal delivery.  Outcome: Progressing

## 2023-12-28 NOTE — OB LABOR/OXYTOCIN SAFETY PROGRESS
Labor Progress Note - Tabitha Jaimes 33 y.o. female MRN: [id number]    Unit/Bed#: -01 Encounter: [id number]       Contraction Frequency (minutes): 1.5-3  Contraction Intensity: Mild  Uterine Activity Characteristics: Irregular (w/ irritability)  Cervical Dilation: 1        Cervical Effacement: 70  Fetal Station: -2  Baseline Rate (FHR): 135 bpm  Fetal Heart Rate (FHT): 144 BPM  FHR Category: 1               Vital Signs:   Vitals:    12/28/23 0206   BP: 128/69   Pulse: 64   Resp: 17   Temp: 98.2 °F (36.8 °C)   SpO2:        Notes/comments:   S/p vaginal cytotec administration at 0039. Plan for re-examination at 0339 for consideration of Stein balloon placement and re-dose cytotec vs. Pitocin infusion for continued induction management. FHT category 1. Ctx q2-4m on tocometry.    Kevin Palafox MD 12/28/2023 3:04 AM

## 2023-12-28 NOTE — L&D DELIVERY NOTE
Vaginal Delivery Summary - OB/GYN   Tabitha Jaimes 33 y.o. female MRN: [id number]  Unit/Bed#: -01 Encounter: [id number]      Pre-delivery Diagnosis:   1. Pregnancy at 41 weeks gestation   2. Encounter  for induction of labor  3. PCOS  4. Weak D positivity     Post-delivery Diagnosis: same, delivered    Procedure: Spontaneous Vaginal Delivery     Attending: Yomaira    Assistant(s): Tia    Anesthesia: None    QBL: 290 mL    Complications: none apparent    Specimens:   1. Arterial and venous cord gases  2. Cord blood  3. Segment of umbilical cord  4. Placenta to storage     Findings:  1. Viable female on 2023 at 1756, with APGARS of 9 and 9 at 1 and 5 minutes respectively  2. Spontaneous delivery of intact placenta at 1805  3. 2 degree laceration repaired with 3-0 Vicryl   4. Blood gases:   Arterial pH: 7.332   Arterial base excess: -2.1   Venous pH: 7.408   Venous base excess: -1.9    Disposition:  Patient tolerated the procedure well and was recovering in labor and delivery room       Brief history and labor course:  Ms. Tabitha Jaimes is a 33 y.o.  at 41w2d. She presented to labor and delivery for induction of labor. Her pregnancy was complicated by weak D positivity, transfer of care from [place], and PCOS. On exam on arrival she was noted to be 1/70/-2. She was induced with cytotec, grimaldo balloon, and pitocin. SROM for clear fluid at 1748. She progressed to complete and started pushing.    Description of procedure:  After pushing for 6 minutes, at 1856 patient delivered a viable female , wt pending, apgars of 9 (1 min) and 9 (5 min). The fetal vertex delivered spontaneously. There was no nuchal cord. The anterior shoulder delivered atraumatically with maternal expulsive forces and the assistance of gentle downward traction. The posterior shoulder delivered with maternal expulsive forces and the assistance of gentle upward traction. The remainder of the fetus delivered spontaneously.     Upon  delivery, the infant was placed on the mothers abdomen and the cord was clamped and cut after delayed cord clamping. The infant was noted to cry spontaneously and was moving all extremities appropriately. There was no evidence for injury. Awaiting nurse resuscitators evaluated the . Arterial and venous cord blood gases and cord blood was collected for analysis. These were promptly sent to the lab. In the immediate post-partum, 30 units of IV pitocin was administered, and the uterus was noted to contract down well with massage and pitocin. The placenta delivered spontaneously at 1805 and was noted to have a centrally inserted 3 vessel cord.     The vagina, cervix, perineum, and rectum were inspected and there was noted to be a 2nd degree laceration which was repaired with 3-0 Vicryl rapide. Under adequate local anesthesia with lidocaine, the apex of the vaginal laceration was identified and an anchoring suture was placed 1 cm above the apex. The vaginal mucosa and underlying rectovaginal fascia were closed using a running locked 3-0 Vicryl-CT 1 suture to the hymenal ring. The suture was then brought underneath the hymenal ring. A stitch was then placed through the bulbocavernosus muscle. Continuing with the same suture, the transverse perineal muscles were reapproximated. The suture was brought to the posterior apex of the skin laceration and then the skin was reapproximated in a subcuticular fashion to the hymenal ring. Excellent hemostasis was achieved.     Fundal massage revealed minimal clots and good uterine tone.  The fundus was firm and at the level of the umbilicus.  At the conclusion of the procedure, all needle, sponge, and instrument counts were noted to be correct. Patient tolerated the procedure well and was allowed to recover in labor and delivery room with family and  before being transferred to the post-partum floor. Dr. Burnette was present and participated in all key portions of the  case.      Carmen Weber MD  12/28/2023  6:54 PM

## 2023-12-28 NOTE — PLAN OF CARE
Problem: BIRTH - VAGINAL/ SECTION  Goal: Fetal and maternal status remain reassuring during the birth process  Description: INTERVENTIONS:  - Monitor vital signs  - Monitor fetal heart rate  - Monitor uterine activity  - Monitor labor progression (vaginal delivery)  - DVT prophylaxis  - Antibiotic prophylaxis  Outcome: Progressing  Goal: Emotionally satisfying birthing experience for mother/fetus  Description: Interventions:  - Assess, plan, implement and evaluate the nursing care given to the patient in labor  - Advocate the philosophy that each childbirth experience is a unique experience and support the family's chosen level of involvement and control during the labor process   - Actively participate in both the patient's and family's teaching of the birth process  - Consider cultural, Quaker and age-specific factors and plan care for the patient in labor  Outcome: Progressing     Problem: PAIN - ADULT  Goal: Verbalizes/displays adequate comfort level or baseline comfort level  Description: Interventions:  - Encourage patient to monitor pain and request assistance  - Assess pain using appropriate pain scale  - Administer analgesics based on type and severity of pain and evaluate response  - Implement non-pharmacological measures as appropriate and evaluate response  - Consider cultural and social influences on pain and pain management  - Notify physician/advanced practitioner if interventions unsuccessful or patient reports new pain  Outcome: Progressing     Problem: INFECTION - ADULT  Goal: Absence or prevention of progression during hospitalization  Description: INTERVENTIONS:  - Assess and monitor for signs and symptoms of infection  - Monitor lab/diagnostic results  - Monitor all insertion sites, i.e. indwelling lines, tubes, and drains  - Monitor endotracheal if appropriate and nasal secretions for changes in amount and color  - [place] appropriate cooling/warming therapies per order  -  Administer medications as ordered  - Instruct and encourage patient and family to use good hand hygiene technique  - Identify and instruct in appropriate isolation precautions for identified infection/condition  Outcome: Progressing  Goal: Absence of fever/infection during neutropenic period  Description: INTERVENTIONS:  - Monitor WBC    Outcome: Progressing     Problem: SAFETY ADULT  Goal: Patient will remain free of falls  Description: INTERVENTIONS:  - Educate patient/family on patient safety including physical limitations  - Instruct patient to call for assistance with activity   - Consult OT/PT to assist with strengthening/mobility   - Keep Call bell within reach  - Keep bed low and locked with side rails adjusted as appropriate  - Keep care items and personal belongings within reach  - Initiate and maintain comfort rounds  - Make Fall Risk Sign visible to staff  - Offer Toileting every  Hours, in advance of need  - Initiate/Maintain alarm  - Obtain necessary fall risk management equipment:   - Apply yellow socks and bracelet for high fall risk patients  - Consider moving patient to room near nurses station  Outcome: Progressing  Goal: Maintain or return to baseline ADL function  Description: INTERVENTIONS:  -  Assess patient's ability to carry out ADLs; assess patient's baseline for ADL function and identify physical deficits which impact ability to perform ADLs (bathing, care of mouth/teeth, toileting, grooming, dressing, etc.)  - Assess/evaluate cause of self-care deficits   - Assess range of motion  - Assess patient's mobility; develop plan if impaired  - Assess patient's need for assistive devices and provide as appropriate  - Encourage maximum independence but intervene and supervise when necessary  - Involve family in performance of ADLs  - Assess for home care needs following discharge   - Consider OT consult to assist with ADL evaluation and planning for discharge  - Provide patient education as  appropriate  Outcome: Progressing  Goal: Maintains/Returns to pre admission functional level  Description: INTERVENTIONS:  - Perform AM-PAC 6 Click Basic Mobility/ Daily Activity assessment daily.  - Set and communicate daily mobility goal to care team and patient/family/caregiver.   - Collaborate with rehabilitation services on mobility goals if consulted  - Perform Range of Motion  times a day.  - Reposition patient every  hours.  - Dangle patient  times a day  - Stand patient  times a day  - Ambulate patient  times a day  - Out of bed to chair  times a day   - Out of bed for meals  times a day  - Out of bed for toileting  - Record patient progress and toleration of activity level   Outcome: Progressing     Problem: Knowledge Deficit  Goal: Patient/family/caregiver demonstrates understanding of disease process, treatment plan, medications, and discharge instructions  Description: Complete learning assessment and assess knowledge base.  Interventions:  - Provide teaching at level of understanding  - Provide teaching via preferred learning methods  Outcome: Progressing  Goal: Verbalizes understanding of labor plan  Description: Assess patient/family/caregiver's baseline knowledge level and ability to understand information.  Provide education via patient/family/caregiver's preferred learning method at appropriate level of understanding.     1. Provide teaching at level of understanding.  2. Provide teaching via preferred learning method(s).  Outcome: Progressing     Problem: DISCHARGE PLANNING  Goal: Discharge to home or other facility with appropriate resources  Description: INTERVENTIONS:  - Identify barriers to discharge w/patient and caregiver  - Arrange for needed discharge resources and transportation as appropriate  - Identify discharge learning needs (meds, wound care, etc.)  - Arrange for interpretive services to assist at discharge as needed  - Refer to Case Management Department for coordinating discharge  planning if the patient needs post-hospital services based on physician/advanced practitioner order or complex needs related to functional status, cognitive ability, or social support system  Outcome: Progressing     Problem: Labor & Delivery  Goal: Manages discomfort  Description: Assess and monitor for signs and symptoms of discomfort.  Assess patient's pain level regularly and per hospital policy.  Administer medications as ordered. Support use of nonpharmacological methods to help control pain such as distraction, imagery, relaxation, and application of heat and cold.  Collaborate with interdisciplinary team and patient to determine appropriate pain management plan.    1. Include patient in decisions related to comfort.  2. Offer non-pharmacological pain management interventions.  3. Report ineffective pain management to physician.  Outcome: Progressing  Goal: Patient vital signs are stable  Description: 1. Assess vital signs - vaginal delivery.  Outcome: Progressing

## 2023-12-28 NOTE — OB LABOR/OXYTOCIN SAFETY PROGRESS
Oxytocin Safety Progress Check Note - Tabitha Jaimes 33 y.o. female MRN: [id number]    Unit/Bed#: -01 Encounter: [id number]    Dose (esdras-units/min) Oxytocin: 2 esdras-units/min  Contraction Frequency (minutes): 1-3  Contraction Intensity: Moderate  Uterine Activity Characteristics: Regular  Cervical Dilation: 4-5        Cervical Effacement: 80  Fetal Station: -1  Baseline Rate (FHR): 135 bpm  Fetal Heart Rate (FHT): 135 BPM  FHR Category: 2               Vital Signs:   Vitals:    12/28/23 0930   BP: 113/73   Pulse: 70   Resp:    Temp:    SpO2:        Patient very uncomfortable through contractions, declines epidural at this time.  SVE as above. FHT cat 2 for occasional late decelerations. Ubaldo q1-3. Pitocin at 2, will continue to titrate as tolerated. D/w Dr. Burntete.       Coco Poole MD 12/28/2023 9:48 AM

## 2023-12-28 NOTE — H&P
H & P- Obstetrics   Tabitha Jaimes 33 y.o. female MRN: [id number]  Unit/Bed#: -01 Encounter: [id number]      Assessment/Plan:    Tabitha is a 33 y.o.  at 41w2d admitted for an induction of labor    SVE: Cervical Dilation: 1  Cervical Effacement: 70  Cervical Consistency: Soft  Fetal Station: -2  Presentation: Vertex  Position: Unknown  Method: Manual  OB Examiner: Lisa    * Encounter for induction of labor  Assessment & Plan  Admit to OB/GYN service  Follow up CBC, RPR, Blood Type  EFW: 53%   VTX by transabdominal ultrasound   Induction method cytotec + consider FB  GBS neg status  Analgesia and/or epidural at patient request  Anticipate                  Patient of: [facility]   This patient will be an INPATIENT  and I certify the anticipated length of stay is >2 Midnights  Discussed with Dr. Feliciano      SUBJECTIVE:    Chief Complaint: I am here for my induction    HPI: Tabitha Jaimes is a 33 y.o.  with an RENITA of 2023, by Ultrasound at 41w2d who is being admitted for induction. She denies having uterine contractions, has no LOF, and reports no VB. She states she has felt good FM.. This pregnancy is complicated by weak D pos, transfer from [place], PCOS. All other review of systems is negative.       Pregnancy Plan:  Pregnancy: Almazan  Fetal sex: Female     Post-Delivery Plans  Feeding intentions: Breast Milk      Patient Active Problem List   Diagnosis    37 weeks gestation of pregnancy    Polycystic ovarian syndrome    Rh negative state in antepartum period, third trimester    Encounter for supervision of other normal pregnancy, third trimester    Panic disorder    Encounter for induction of labor       OB History    Para Term  AB Living   1             SAB IAB Ectopic Multiple Live Births                  # Outcome Date GA Lbr Godwin/2nd Weight Sex Delivery Anes PTL Lv   1 Current                Past Medical History:   Diagnosis Date    PCOS (polycystic ovarian  "syndrome)     dx in early 20's    Varicella     had chicken pox       No past surgical history on file.    Social History     Tobacco Use    Smoking status: Former     Types: Cigarettes    Smokeless tobacco: Never   Substance Use Topics    Alcohol use: Not Currently       No Known Allergies    Medications Prior to Admission   Medication    cetirizine (ZyrTEC) 10 mg tablet    fluticasone (FLONASE) 50 mcg/act nasal spray    MAGNESIUM GLYCINATE PO    Prenatal Vit-Fe Fumarate-FA (PRENATAL VITAMIN PO)           OBJECTIVE:  Vitals:     There is no height or weight on file to calculate BMI.     Physical Exam:  General: Well appearing, no distress  Respiratory: Unlabored breathing  Cardiovascular: Regular rate.  Abdomen: Soft, gravid, nontender  Fundal Height: Appropriate for gestational age.  Extremities: Warm and well perfused.  Non tender.  Psychiatric: Behavioral normal        FHT:   Reactive NST    TOCO:    Occasional contractions/irritability      Prenatal Labs: Blood Type:   Lab Results   Component Value Date/Time    ABO Grouping O 08/31/2023 04:59 PM     , D (Rh type):   Lab Results   Component Value Date/Time    Rh Factor Positive 08/31/2023 04:59 PM     , Antibody Screen: No results found for: \"ANTIBODYSCR\" , HCT/HGB:   Lab Results   Component Value Date/Time    Hematocrit 38.6 10/12/2023 06:39 PM    Hemoglobin 13.2 10/12/2023 06:39 PM      , Platelets:   Lab Results   Component Value Date/Time    Platelets 189 10/12/2023 06:39 PM      , 1 hour Glucola:   Lab Results   Component Value Date/Time    Glucose 84 09/14/2023 02:37 PM   , Varicella:   Lab Results   Component Value Date/Time    Varicella IgG IMMUNE 08/31/2023 04:59 PM       , Rubella:   Lab Results   Component Value Date/Time    Rubella IgG Quant 29.0 08/31/2023 04:59 PM        , VDRL/RPR: No results found for: \"RPR\"   , Urine Culture/Screen:   Lab Results   Component Value Date/Time    Urine Culture 40,000-49,000 cfu/ml 08/31/2023 04:59 PM       , Urine " "Drug Screen: No results found for: \"AMPHETUR\", \"BARBTUR\", \"BDZUR\", \"THCUR\", \"COCAINEUR\", \"METHADONEUR\", \"OPIATEUR\", \"PCPUR\", \"MTHAMUR\", \"ECSTASYUR\", \"TRICYCLICSUR\", Hep B:   Lab Results   Component Value Date/Time    Hepatitis B Surface Ag Non-reactive 08/31/2023 04:59 PM     , Hep C: No components found for: \"HEPCSAG\", \"EXTHEPCSAG\"   , HIV: No results found for: \"HIVAGAB\"  , Chlamydia: No results found for: \"EXTCHLAMYDIA\"  , Gonorrhea:   Lab Results   Component Value Date/Time    N gonorrhoeae, DNA Probe Negative 11/21/2023 11:41 AM     , Group B Strep:    Lab Results   Component Value Date/Time    Strep Grp B PCR Negative 11/21/2023 11:41 AM      ,       Irina Gonzalez,   12/27/2023  10:35 PM        Portions of the record may have been created with voice recognition software.  Occasional wrong word or \"sound a like\" substitutions may have occurred due to the inherent limitations of voice recognition software.  Read the chart carefully and recognize, using context, where substitutions have occurred    "

## 2023-12-28 NOTE — ASSESSMENT & PLAN NOTE
Admit to OB/GYN service  Follow up CBC, RPR, Blood Type  EFW: 53%   VTX by transabdominal ultrasound   Induction method cytotec + consider FB  GBS neg status  Analgesia and/or epidural at patient request  Anticipate

## 2023-12-28 NOTE — OB LABOR/OXYTOCIN SAFETY PROGRESS
Oxytocin Safety Progress Check Note - Tabitha Jaimes 33 y.o. female MRN: [id number]    Unit/Bed#: -01 Encounter: [id number]    Dose (esdras-units/min) Oxytocin: 6 esdras-units/min  Contraction Frequency (minutes): 2-4  Contraction Intensity: Moderate  Uterine Activity Characteristics: Regular  Cervical Dilation: 7        Cervical Effacement: 80  Fetal Station: -1  Baseline Rate (FHR): 125 bpm  Fetal Heart Rate (FHT): 130 BPM  FHR Category: 1               Vital Signs:   Vitals:    12/28/23 1435   BP: 121/70   Pulse: 68   Resp:    Temp:    SpO2:        Notes/comments:   Patient becoming more uncomfortable with contractions. SVE as above. Continue pitocin titration.  Dr. Yomaira Bergeron MD 12/28/2023 2:54 PM

## 2023-12-28 NOTE — OB LABOR/OXYTOCIN SAFETY PROGRESS
Labor Progress Note - Tabitha Jaimes 33 y.o. female MRN: [id number]    Unit/Bed#: -01 Encounter: [id number]       Contraction Frequency (minutes): 2-5  Contraction Intensity: Mild  Uterine Activity Characteristics: Irregular (w/ irritability)  Cervical Dilation: 1        Cervical Effacement: 70  Fetal Station: -3  Baseline Rate (FHR): 130 bpm  Fetal Heart Rate (FHT): 131 BPM  FHR Category: 1               Vital Signs:   Vitals:    12/28/23 0206   BP: 128/69   Pulse: 64   Resp: 17   Temp: 98.2 °F (36.8 °C)   SpO2:        Notes/comments:   SVE 1/70/-3 on re-examination. FHT category 1. Ctx q2-5m. Plan for pitocin for continued induction management. Soliz balloon placed during examination.    PROCEDURE:  SOLIZ BALLOON PLACEMENT    A 24F soliz with a 30cc balloon was selected, SVE was performed and cervix was located, soliz was introduced over sterile gloved hands. Balloon advanced through cervix beyond the internal cervical os. A small amount amount of sterile saline solution was instilled in the balloon to confirm placement. Placement was confirmed to be beyond the internal cervical os. A total of 60cc of sterile saline solution was placed into the balloon. Pt tolerated well. Instructions left with RN to place soliz to gravity with a 1L bag of IV fluid. Notify MD when soliz dislodged.    MD Kevin Real MD 12/28/2023 3:59 AM

## 2023-12-28 NOTE — DISCHARGE SUMMARY
Discharge Summary - OB/GYN  Tabitha Jaimes 33 y.o. female MRN: [id number]  Unit/Bed#: -01 Encounter: [id number]    Admission Date: 2023     Discharge Date: 2023    Admitting Attending: Braden     Delivering Attending: Yomaira    Discharging Attending: Jet    Principal Diagnosis: Pregnancy at 41w3d    Secondary Diagnosis:   1. PCOS  2. Weak D positivity     Procedures: spontaneous vaginal delivery    Anesthesia: local    Hospital course: Ms. Tabitha Jaimes is a 33 y.o.  at 41w2d. She presented to labor and delivery for induction of labor. Her pregnancy was complicated by weak D positivity, transfer of care from [place], and PCOS. On exam on arrival she was noted to be 1/70/-2. She was induced with cytotec, grimaldo balloon, and pitocin. SROM for clear fluid at 1748. She progressed to complete and started pushing.    She delivered a viable female  on 2023 at 1756. Weight 7lbs 7.9oz via normal spontaneous vaginal delivery. She sustained a 2nd degree perineal laceration during delivery which was adequately repaired. Apgars were 9 (1 min) and 9 (5 min).  was transferred to  nursery. Patient tolerated the procedure well.     Her post-delivery course was uncomplicated. Her postpartum pain was well controlled with oral analgesics. She received rhogam.     On day of discharge, she was ambulating and able to reasonably perform all ADLs. She was voiding and had appropriate bowel function. Pain was well controlled. She was discharged home on postpartum day #2 without complications. Patient was instructed to follow up with her OB as an outpatient and was given appropriate warnings to call provider if she develops signs of infection or uncontrolled pain.    Complications: none apparent    Condition at discharge: good     Discharge instructions/Information to patient and family:   See after visit summary for information provided to patient and family.      Provisions for Follow-Up  Care:  See after visit summary for information related to follow-up care and any pertinent home health orders.      Disposition: See After Visit Summary for discharge disposition information.    Planned Readmission: No    Discharge medications and instructions:   Please see AVS for full list of medications upon discharge.      Carmen Weber MD  PGY-1 OB/GYN

## 2023-12-29 PROBLEM — Z34.90 ENCOUNTER FOR INDUCTION OF LABOR: Status: RESOLVED | Noted: 2023-12-27 | Resolved: 2023-12-29

## 2023-12-29 LAB
ABO GROUP BLD: NORMAL
BLD GP AB SCN SERPL QL: NEGATIVE
FETAL RBC/1000 RBC BLD KLEIH BETKE-RTO: 0 % (ref 0–1)
RH BLD: POSITIVE

## 2023-12-29 PROCEDURE — 99024 POSTOP FOLLOW-UP VISIT: CPT | Performed by: OBSTETRICS & GYNECOLOGY

## 2023-12-29 PROCEDURE — 86850 RBC ANTIBODY SCREEN: CPT | Performed by: OBSTETRICS & GYNECOLOGY

## 2023-12-29 PROCEDURE — 81403 MOPATH PROCEDURE LEVEL 4: CPT | Performed by: OBSTETRICS & GYNECOLOGY

## 2023-12-29 PROCEDURE — 85461 HEMOGLOBIN FETAL: CPT | Performed by: OBSTETRICS & GYNECOLOGY

## 2023-12-29 PROCEDURE — 86900 BLOOD TYPING SEROLOGIC ABO: CPT | Performed by: OBSTETRICS & GYNECOLOGY

## 2023-12-29 PROCEDURE — 85460 HEMOGLOBIN FETAL: CPT | Performed by: OBSTETRICS & GYNECOLOGY

## 2023-12-29 PROCEDURE — 86901 BLOOD TYPING SEROLOGIC RH(D): CPT | Performed by: OBSTETRICS & GYNECOLOGY

## 2023-12-29 RX ADMIN — IBUPROFEN 600 MG: 600 TABLET ORAL at 19:46

## 2023-12-29 RX ADMIN — IBUPROFEN 600 MG: 600 TABLET ORAL at 08:35

## 2023-12-29 RX ADMIN — IBUPROFEN 600 MG: 600 TABLET ORAL at 13:53

## 2023-12-29 RX ADMIN — HUMAN RHO(D) IMMUNE GLOBULIN 300 MCG: 300 INJECTION, SOLUTION INTRAMUSCULAR at 15:26

## 2023-12-29 NOTE — PLAN OF CARE
Problem: BIRTH - VAGINAL/ SECTION  Goal: Fetal and maternal status remain reassuring during the birth process  Description: INTERVENTIONS:  - Monitor vital signs  - Monitor fetal heart rate  - Monitor uterine activity  - Monitor labor progression (vaginal delivery)  - DVT prophylaxis  - Antibiotic prophylaxis  2023 by Mima Lisa RN  Outcome: Completed  2023 by Mima Lisa RN  Outcome: Progressing  Goal: Emotionally satisfying birthing experience for mother/fetus  Description: Interventions:  - Assess, plan, implement and evaluate the nursing care given to the patient in labor  - Advocate the philosophy that each childbirth experience is a unique experience and support the family's chosen level of involvement and control during the labor process   - Actively participate in both the patient's and family's teaching of the birth process  - Consider cultural, Protestant and age-specific factors and plan care for the patient in labor  2023 by Mima Lisa RN  Outcome: Completed  2023 by Mima Lisa RN  Outcome: Progressing     Problem: POSTPARTUM  Goal: Experiences normal postpartum course  Description: INTERVENTIONS:  - Monitor maternal vital signs  - Assess uterine involution and lochia  Outcome: Progressing  Goal: Appropriate maternal -  bonding  Description: INTERVENTIONS:  - Identify family support  - Assess for appropriate maternal/infant bonding   -Encourage maternal/infant bonding opportunities  - Referral to  or  as needed  Outcome: Progressing  Goal: Establishment of infant feeding pattern  Description: INTERVENTIONS:  - Assess breast/bottle feeding  - Refer to lactation as needed  Outcome: Progressing  Goal: Incision(s), wounds(s) or drain site(s) healing without S/S of infection  Description: INTERVENTIONS  - Assess and document dressing, incision, wound bed, drain sites and surrounding tissue  -  Provide patient and family education  - Perform skin care/dressing changes every   Outcome: Progressing     Problem: PAIN - ADULT  Goal: Verbalizes/displays adequate comfort level or baseline comfort level  Description: Interventions:  - Encourage patient to monitor pain and request assistance  - Assess pain using appropriate pain scale  - Administer analgesics based on type and severity of pain and evaluate response  - Implement non-pharmacological measures as appropriate and evaluate response  - Consider cultural and social influences on pain and pain management  - Notify physician/advanced practitioner if interventions unsuccessful or patient reports new pain  12/28/2023 1934 by Mima Lisa RN  Outcome: Progressing  12/28/2023 0723 by Mima Lisa RN  Outcome: Progressing     Problem: INFECTION - ADULT  Goal: Absence or prevention of progression during hospitalization  Description: INTERVENTIONS:  - Assess and monitor for signs and symptoms of infection  - Monitor lab/diagnostic results  - Monitor all insertion sites, i.e. indwelling lines, tubes, and drains  - Monitor endotracheal if appropriate and nasal secretions for changes in amount and color  - [place] appropriate cooling/warming therapies per order  - Administer medications as ordered  - Instruct and encourage patient and family to use good hand hygiene technique  - Identify and instruct in appropriate isolation precautions for identified infection/condition  12/28/2023 1934 by Mima Lisa RN  Outcome: Progressing  12/28/2023 0723 by Mima Lisa RN  Outcome: Progressing  Goal: Absence of fever/infection during neutropenic period  Description: INTERVENTIONS:  - Monitor WBC    12/28/2023 1934 by Mima Lisa RN  Outcome: Progressing  12/28/2023 0723 by Mima Lisa RN  Outcome: Progressing     Problem: SAFETY ADULT  Goal: Patient will remain free of falls  Description: INTERVENTIONS:  - Educate patient/family on patient  safety including physical limitations  - Instruct patient to call for assistance with activity   - Consult OT/PT to assist with strengthening/mobility   - Keep Call bell within reach  - Keep bed low and locked with side rails adjusted as appropriate  - Keep care items and personal belongings within reach  - Initiate and maintain comfort rounds  - Make Fall Risk Sign visible to staff  - Offer Toileting every  Hours, in advance of need  - Initiate/Maintain alarm  - Obtain necessary fall risk management equipment:   - Apply yellow socks and bracelet for high fall risk patients  - Consider moving patient to room near nurses station  12/28/2023 1934 by Mima Lisa RN  Outcome: Progressing  12/28/2023 0723 by Mima Lsia RN  Outcome: Progressing  Goal: Maintain or return to baseline ADL function  Description: INTERVENTIONS:  -  Assess patient's ability to carry out ADLs; assess patient's baseline for ADL function and identify physical deficits which impact ability to perform ADLs (bathing, care of mouth/teeth, toileting, grooming, dressing, etc.)  - Assess/evaluate cause of self-care deficits   - Assess range of motion  - Assess patient's mobility; develop plan if impaired  - Assess patient's need for assistive devices and provide as appropriate  - Encourage maximum independence but intervene and supervise when necessary  - Involve family in performance of ADLs  - Assess for home care needs following discharge   - Consider OT consult to assist with ADL evaluation and planning for discharge  - Provide patient education as appropriate  12/28/2023 1934 by Mima Lisa RN  Outcome: Progressing  12/28/2023 0723 by Mima Lisa RN  Outcome: Progressing  Goal: Maintains/Returns to pre admission functional level  Description: INTERVENTIONS:  - Perform AM-PAC 6 Click Basic Mobility/ Daily Activity assessment daily.  - Set and communicate daily mobility goal to care team and patient/family/caregiver.   -  Collaborate with rehabilitation services on mobility goals if consulted  - Perform Range of Motion  times a day.  - Reposition patient every  hours.  - Dangle patient  times a day  - Stand patient  times a day  - Ambulate patient  times a day  - Out of bed to chair  times a day   - Out of bed for meals  times a day  - Out of bed for toileting  - Record patient progress and toleration of activity level   12/28/2023 1934 by Mima Lisa, RN  Outcome: Progressing  12/28/2023 0723 by Mima Lisa, RN  Outcome: Progressing

## 2023-12-29 NOTE — PLAN OF CARE
Problem: PAIN - ADULT  Goal: Verbalizes/displays adequate comfort level or baseline comfort level  Description: Interventions:  - Encourage patient to monitor pain and request assistance  - Assess pain using appropriate pain scale  - Administer analgesics based on type and severity of pain and evaluate response  - Implement non-pharmacological measures as appropriate and evaluate response  - Consider cultural and social influences on pain and pain management  - Notify physician/advanced practitioner if interventions unsuccessful or patient reports new pain  Outcome: Progressing     Problem: INFECTION - ADULT  Goal: Absence or prevention of progression during hospitalization  Description: INTERVENTIONS:  - Assess and monitor for signs and symptoms of infection  - Monitor lab/diagnostic results  - Monitor all insertion sites, i.e. indwelling lines, tubes, and drains  - Monitor endotracheal if appropriate and nasal secretions for changes in amount and color  - [place] appropriate cooling/warming therapies per order  - Administer medications as ordered  - Instruct and encourage patient and family to use good hand hygiene technique  - Identify and instruct in appropriate isolation precautions for identified infection/condition  Outcome: Progressing  Goal: Absence of fever/infection during neutropenic period  Description: INTERVENTIONS:  - Monitor WBC    Outcome: Progressing     Problem: SAFETY ADULT  Goal: Patient will remain free of falls  Description: INTERVENTIONS:  - Educate patient/family on patient safety including physical limitations  - Instruct patient to call for assistance with activity   - Consult OT/PT to assist with strengthening/mobility   - Keep Call bell within reach  - Keep bed low and locked with side rails adjusted as appropriate  - Keep care items and personal belongings within reach  - Initiate and maintain comfort rounds  - Make Fall Risk Sign visible to staff  - Apply yellow socks and bracelet  for high fall risk patients  - Consider moving patient to room near nurses station  Outcome: Progressing  Goal: Maintain or return to baseline ADL function  Description: INTERVENTIONS:  -  Assess patient's ability to carry out ADLs; assess patient's baseline for ADL function and identify physical deficits which impact ability to perform ADLs (bathing, care of mouth/teeth, toileting, grooming, dressing, etc.)  - Assess/evaluate cause of self-care deficits   - Assess range of motion  - Assess patient's mobility; develop plan if impaired  - Assess patient's need for assistive devices and provide as appropriate  - Encourage maximum independence but intervene and supervise when necessary  - Involve family in performance of ADLs  - Assess for home care needs following discharge   - Consider OT consult to assist with ADL evaluation and planning for discharge  - Provide patient education as appropriate  Outcome: Progressing  Goal: Maintains/Returns to pre admission functional level  Description: INTERVENTIONS:  - Perform AM-PAC 6 Click Basic Mobility/ Daily Activity assessment daily.  - Set and communicate daily mobility goal to care team and patient/family/caregiver.   - Collaborate with rehabilitation services on mobility goals if consulted  - Out of bed for toileting  - Record patient progress and toleration of activity level   Outcome: Progressing     Problem: DISCHARGE PLANNING  Goal: Discharge to home or other facility with appropriate resources  Description: INTERVENTIONS:  - Identify barriers to discharge w/patient and caregiver  - Arrange for needed discharge resources and transportation as appropriate  - Identify discharge learning needs (meds, wound care, etc.)  - Arrange for interpretive services to assist at discharge as needed  - Refer to Case Management Department for coordinating discharge planning if the patient needs post-hospital services based on physician/advanced practitioner order or complex needs  related to functional status, cognitive ability, or social support system  Outcome: Progressing     Problem: POSTPARTUM  Goal: Experiences normal postpartum course  Description: INTERVENTIONS:  - Monitor maternal vital signs  - Assess uterine involution and lochia  Outcome: Progressing  Goal: Appropriate maternal -  bonding  Description: INTERVENTIONS:  - Identify family support  - Assess for appropriate maternal/infant bonding   -Encourage maternal/infant bonding opportunities  - Referral to  or  as needed  Outcome: Progressing  Goal: Establishment of infant feeding pattern  Description: INTERVENTIONS:  - Assess breast/bottle feeding  - Refer to lactation as needed  Outcome: Progressing  Goal: Incision(s), wounds(s) or drain site(s) healing without S/S of infection  Description: INTERVENTIONS  - Assess and document dressing, incision, wound bed, drain sites and surrounding tissue  - Provide patient and family education  Outcome: Progressing

## 2023-12-29 NOTE — PLAN OF CARE
Problem: PAIN - ADULT  Goal: Verbalizes/displays adequate comfort level or baseline comfort level  Description: Interventions:  - Encourage patient to monitor pain and request assistance  - Assess pain using appropriate pain scale  - Administer analgesics based on type and severity of pain and evaluate response  - Implement non-pharmacological measures as appropriate and evaluate response  - Consider cultural and social influences on pain and pain management  - Notify physician/advanced practitioner if interventions unsuccessful or patient reports new pain  Outcome: Progressing     Problem: INFECTION - ADULT  Goal: Absence or prevention of progression during hospitalization  Description: INTERVENTIONS:  - Assess and monitor for signs and symptoms of infection  - Monitor lab/diagnostic results  - Monitor all insertion sites, i.e. indwelling lines, tubes, and drains  - Monitor endotracheal if appropriate and nasal secretions for changes in amount and color  - [place] appropriate cooling/warming therapies per order  - Administer medications as ordered  - Instruct and encourage patient and family to use good hand hygiene technique  - Identify and instruct in appropriate isolation precautions for identified infection/condition  Outcome: Progressing  Goal: Absence of fever/infection during neutropenic period  Description: INTERVENTIONS:  - Monitor WBC    Outcome: Progressing     Problem: SAFETY ADULT  Goal: Patient will remain free of falls  Description: INTERVENTIONS:  - Educate patient/family on patient safety including physical limitations  - Instruct patient to call for assistance with activity   - Consult OT/PT to assist with strengthening/mobility   - Keep Call bell within reach  - Keep bed low and locked with side rails adjusted as appropriate  - Keep care items and personal belongings within reach  - Initiate and maintain comfort rounds  - Make Fall Risk Sign visible to staff  - Apply yellow socks and bracelet  for high fall risk patients  - Consider moving patient to room near nurses station  Outcome: Progressing  Goal: Maintain or return to baseline ADL function  Description: INTERVENTIONS:  -  Assess patient's ability to carry out ADLs; assess patient's baseline for ADL function and identify physical deficits which impact ability to perform ADLs (bathing, care of mouth/teeth, toileting, grooming, dressing, etc.)  - Assess/evaluate cause of self-care deficits   - Assess range of motion  - Assess patient's mobility; develop plan if impaired  - Assess patient's need for assistive devices and provide as appropriate  - Encourage maximum independence but intervene and supervise when necessary  - Involve family in performance of ADLs  - Assess for home care needs following discharge   - Consider OT consult to assist with ADL evaluation and planning for discharge  - Provide patient education as appropriate  Outcome: Progressing  Goal: Maintains/Returns to pre admission functional level  Description: INTERVENTIONS:  - Perform AM-PAC 6 Click Basic Mobility/ Daily Activity assessment daily.  - Set and communicate daily mobility goal to care team and patient/family/caregiver.   - Collaborate with rehabilitation services on mobility goals if consulted  - Out of bed for toileting  - Record patient progress and toleration of activity level   Outcome: Progressing     Problem: DISCHARGE PLANNING  Goal: Discharge to home or other facility with appropriate resources  Description: INTERVENTIONS:  - Identify barriers to discharge w/patient and caregiver  - Arrange for needed discharge resources and transportation as appropriate  - Identify discharge learning needs (meds, wound care, etc.)  - Arrange for interpretive services to assist at discharge as needed  - Refer to Case Management Department for coordinating discharge planning if the patient needs post-hospital services based on physician/advanced practitioner order or complex needs  related to functional status, cognitive ability, or social support system  Outcome: Progressing     Problem: POSTPARTUM  Goal: Experiences normal postpartum course  Description: INTERVENTIONS:  - Monitor maternal vital signs  - Assess uterine involution and lochia  Outcome: Progressing  Goal: Appropriate maternal -  bonding  Description: INTERVENTIONS:  - Identify family support  - Assess for appropriate maternal/infant bonding   -Encourage maternal/infant bonding opportunities  - Referral to  or  as needed  Outcome: Progressing  Goal: Establishment of infant feeding pattern  Description: INTERVENTIONS:  - Assess breast/bottle feeding  - Refer to lactation as needed  Outcome: Progressing  Goal: Incision(s), wounds(s) or drain site(s) healing without S/S of infection  Description: INTERVENTIONS  - Assess and document dressing, incision, wound bed, drain sites and surrounding tissue  - Provide patient and family education    Outcome: Progressing

## 2023-12-29 NOTE — ASSESSMENT & PLAN NOTE
Continue routine post partum care  Pain well controlled: tylenol/motrin scheduled  Lochia within normal limits: continue to monitor   OOB: as able, encourage ambulation  Passing flatus  Voiding spontaneously  Breastfeeding  Baby in: room  Dispo: anticipate d/c home PPD2  Contraception: undecided

## 2023-12-29 NOTE — LACTATION NOTE
This note was copied from a baby's chart.  CONSULT - LACTATION  Baby Girl (Mg Jaimes 1 days female MRN: [id number]    [facility] AN NURSERY Room / Bed: (N)/(N) Encounter: [id number]    Maternal Information     MOTHER:  Tabitha Jaimes  Maternal Age: 33 y.o.   OB History: # 1 - Date: [birth year], Sex: Female, Weight: 3400 g (7 lb 7.9 oz), GA: 41w3d, Delivery: Vaginal, Spontaneous, Apgar1: 9, Apgar5: 9, Living: Living, Birth Comments: None   Previouse breast reduction surgery? No    Lactation history:   Has patient previously breast fed: No   How long had patient previously breast fed:     Previous breast feeding complications:     History reviewed. No pertinent surgical history.     Birth information:  YOB: 2023   Time of birth: 5:56 PM   Sex: female   Delivery type: Vaginal, Spontaneous   Birth Weight: 3400 g (7 lb 7.9 oz)   Percent of Weight Change: 0%     Gestational Age: 41w3d   [unfilled]    Assessment     Breast and nipple assessment:  no clinical exam at this time     Assessment: sleepy    Feeding assessment: feeding well per parents  LATCH:  Latch: Repeated attempts, hold nipple in mouth, stimulate to suck   Audible Swallowing: A few with stimulation   Type of Nipple: Everted (After stimulation)   Comfort (Breast/Nipple): Soft/non-tender   Hold (Positioning): Partial assist, teach one side, mother does other, staff holds   LATCH Score: 7          Feeding recommendations:  breast feed on demand    Met with mother. Provided mother with Ready, Set, Baby booklet which contained information on:  Hand expression with access to QR codes to review hand expression.  Positioning and latch reviewed as well as showing images of other feeding positions.  Discussed the properties of a good latch in any position.   Feeding on cue and what that means for recognizing infant's hunger, s/s that baby is getting enough milk and some s/s that breastfeeding dyad  may need further help  Skin to Skin contact and benefits to mom and baby  Avoidance of pacifiers for the first month discussed.   Gave information on common concerns, what to expect the first few weeks after delivery, preparing for other caregivers, and how partners can help. Resources for support also provided.    Encouraged parents to call for latch check with the next feeding and as needed.    Francoise Ch RN 12/29/2023 8:49 AM

## 2023-12-30 VITALS
WEIGHT: 181 LBS | OXYGEN SATURATION: 97 % | HEIGHT: 68 IN | DIASTOLIC BLOOD PRESSURE: 68 MMHG | HEART RATE: 65 BPM | RESPIRATION RATE: 18 BRPM | TEMPERATURE: 98.5 F | SYSTOLIC BLOOD PRESSURE: 112 MMHG | BODY MASS INDEX: 27.43 KG/M2

## 2023-12-30 PROCEDURE — 99024 POSTOP FOLLOW-UP VISIT: CPT | Performed by: OBSTETRICS & GYNECOLOGY

## 2023-12-30 RX ADMIN — IBUPROFEN 600 MG: 600 TABLET ORAL at 01:55

## 2023-12-30 RX ADMIN — IBUPROFEN 600 MG: 600 TABLET ORAL at 10:52

## 2023-12-30 NOTE — PLAN OF CARE
Problem: PAIN - ADULT  Goal: Verbalizes/displays adequate comfort level or baseline comfort level  Description: Interventions:  - Encourage patient to monitor pain and request assistance  - Assess pain using appropriate pain scale  - Administer analgesics based on type and severity of pain and evaluate response  - Implement non-pharmacological measures as appropriate and evaluate response  - Consider cultural and social influences on pain and pain management  - Notify physician/advanced practitioner if interventions unsuccessful or patient reports new pain  Outcome: Adequate for Discharge     Problem: INFECTION - ADULT  Goal: Absence or prevention of progression during hospitalization  Description: INTERVENTIONS:  - Assess and monitor for signs and symptoms of infection  - Monitor lab/diagnostic results  - Monitor all insertion sites, i.e. indwelling lines, tubes, and drains  - Monitor endotracheal if appropriate and nasal secretions for changes in amount and color  - [place] appropriate cooling/warming therapies per order  - Administer medications as ordered  - Instruct and encourage patient and family to use good hand hygiene technique  - Identify and instruct in appropriate isolation precautions for identified infection/condition  Outcome: Adequate for Discharge  Goal: Absence of fever/infection during neutropenic period  Description: INTERVENTIONS:  - Monitor WBC    Outcome: Adequate for Discharge     Problem: SAFETY ADULT  Goal: Patient will remain free of falls  Description: INTERVENTIONS:  - Educate patient/family on patient safety including physical limitations  - Instruct patient to call for assistance with activity   - Consult OT/PT to assist with strengthening/mobility   - Keep Call bell within reach  - Keep bed low and locked with side rails adjusted as appropriate  - Keep care items and personal belongings within reach  - Initiate and maintain comfort rounds  - Make Fall Risk Sign visible to  staff  - Apply yellow socks and bracelet for high fall risk patients  - Consider moving patient to room near nurses station  Outcome: Adequate for Discharge  Goal: Maintain or return to baseline ADL function  Description: INTERVENTIONS:  -  Assess patient's ability to carry out ADLs; assess patient's baseline for ADL function and identify physical deficits which impact ability to perform ADLs (bathing, care of mouth/teeth, toileting, grooming, dressing, etc.)  - Assess/evaluate cause of self-care deficits   - Assess range of motion  - Assess patient's mobility; develop plan if impaired  - Assess patient's need for assistive devices and provide as appropriate  - Encourage maximum independence but intervene and supervise when necessary  - Involve family in performance of ADLs  - Assess for home care needs following discharge   - Consider OT consult to assist with ADL evaluation and planning for discharge  - Provide patient education as appropriate  Outcome: Adequate for Discharge  Goal: Maintains/Returns to pre admission functional level  Description: INTERVENTIONS:  - Perform AM-PAC 6 Click Basic Mobility/ Daily Activity assessment daily.  - Set and communicate daily mobility goal to care team and patient/family/caregiver.   - Collaborate with rehabilitation services on mobility goals if consulted  - Out of bed for toileting  - Record patient progress and toleration of activity level   Outcome: Adequate for Discharge     Problem: DISCHARGE PLANNING  Goal: Discharge to home or other facility with appropriate resources  Description: INTERVENTIONS:  - Identify barriers to discharge w/patient and caregiver  - Arrange for needed discharge resources and transportation as appropriate  - Identify discharge learning needs (meds, wound care, etc.)  - Arrange for interpretive services to assist at discharge as needed  - Refer to Case Management Department for coordinating discharge planning if the patient needs post-hospital  services based on physician/advanced practitioner order or complex needs related to functional status, cognitive ability, or social support system  Outcome: Adequate for Discharge     Problem: POSTPARTUM  Goal: Experiences normal postpartum course  Description: INTERVENTIONS:  - Monitor maternal vital signs  - Assess uterine involution and lochia  Outcome: Adequate for Discharge  Goal: Appropriate maternal -  bonding  Description: INTERVENTIONS:  - Identify family support  - Assess for appropriate maternal/infant bonding   -Encourage maternal/infant bonding opportunities  - Referral to  or  as needed  Outcome: Adequate for Discharge  Goal: Establishment of infant feeding pattern  Description: INTERVENTIONS:  - Assess breast/bottle feeding  - Refer to lactation as needed  Outcome: Adequate for Discharge  Goal: Incision(s), wounds(s) or drain site(s) healing without S/S of infection  Description: INTERVENTIONS  - Assess and document dressing, incision, wound bed, drain sites and surrounding tissue  - Provide patient and family education  - Perform skin care/dressing changes roxanne  Outcome: Adequate for Discharge

## 2023-12-30 NOTE — PROGRESS NOTES
"Progress Note - OB/GYN  Tabitha Jaimes 33 y.o. female MRN: [id number]  Unit/Bed#:  301-01 Encounter: [id number]    Assessment and Plan     Tabitha Jaimes is a patient of: [facility]. She is PPD# 2 s/p  spontaneous vaginal delivery  Recovering well and is stable       *  (spontaneous vaginal delivery)  Assessment & Plan  Continue routine post partum care  Pain well controlled: tylenol/motrin scheduled  Lochia within normal limits: continue to monitor   OOB: as able, encourage ambulation  Passing flatus  Voiding spontaneously  Breastfeeding  Baby in: room  Dispo: anticipate d/c home PPD2  Contraception: undecided        Disposition    - Anticipate discharge home today      Subjective/Objective     Chief Complaint: Postpartum State     Subjective:    Tabitha Jaimes is PPD#2 s/p  spontaneous vaginal delivery. She has no current complaints.  Pain is well controlled.  Patient is currently voiding.  She is ambulating.  Patient is currently passing flatus and has had no bowel movement. She is tolerating PO, and denies nausea or vomitting. Patient denies fever, chills, chest pain, shortness of breath, or calf tenderness. Lochia is normal. She is  Breastfeeding. She is recovering well and is stable.       Vitals:   /64 (BP Location: Right arm)   Pulse 63   Temp 97.6 °F (36.4 °C) (Oral)   Resp 18   Ht 5' 8\" (1.727 m)   Wt 82.1 kg (181 lb)   LMP 2023   SpO2 97%   Breastfeeding Yes   BMI 27.52 kg/m²     No intake or output data in the 24 hours ending 23 0720    Invasive Devices       None                   Physical Exam:   GEN: Tabitha Jaimes appears well, alert and oriented x 3, pleasant and cooperative   CARDIO: RRR, no murmurs or rubs  RESP:  CTAB, no wheezes or rales  ABDOMEN: soft, no tenderness, no distention, fundus @ U-3  EXTREMITIES: non tender, no erythema, b/l Jean-Paul's sign negative      Labs:     Hemoglobin   Date Value Ref Range Status   2023 14.4 11.5 - 15.4 g/dL " Final   10/12/2023 13.2 11.5 - 15.4 g/dL Final     WBC   Date Value Ref Range Status   12/27/2023 11.50 (H) 4.31 - 10.16 Thousand/uL Final   10/12/2023 12.61 (H) 4.31 - 10.16 Thousand/uL Final     Platelets   Date Value Ref Range Status   12/27/2023 185 149 - 390 Thousands/uL Final   10/12/2023 189 149 - 390 Thousands/uL Final     Creatinine   Date Value Ref Range Status   10/12/2023 0.47 (L) 0.60 - 1.30 mg/dL Final     Comment:     Standardized to IDMS reference method     AST   Date Value Ref Range Status   10/12/2023 26 13 - 39 U/L Final     ALT   Date Value Ref Range Status   10/12/2023 29 7 - 52 U/L Final     Comment:     Specimen collection should occur prior to Sulfasalazine administration due to the potential for falsely depressed results.           Carmen Weber MD  12/30/2023  7:20 AM

## 2023-12-30 NOTE — PLAN OF CARE
Problem: PAIN - ADULT  Goal: Verbalizes/displays adequate comfort level or baseline comfort level  Description: Interventions:  - Encourage patient to monitor pain and request assistance  - Assess pain using appropriate pain scale  - Administer analgesics based on type and severity of pain and evaluate response  - Implement non-pharmacological measures as appropriate and evaluate response  - Consider cultural and social influences on pain and pain management  - Notify physician/advanced practitioner if interventions unsuccessful or patient reports new pain  Outcome: Progressing     Problem: INFECTION - ADULT  Goal: Absence or prevention of progression during hospitalization  Description: INTERVENTIONS:  - Assess and monitor for signs and symptoms of infection  - Monitor lab/diagnostic results  - Monitor all insertion sites, i.e. indwelling lines, tubes, and drains  - Monitor endotracheal if appropriate and nasal secretions for changes in amount and color  - [place] appropriate cooling/warming therapies per order  - Administer medications as ordered  - Instruct and encourage patient and family to use good hand hygiene technique  - Identify and instruct in appropriate isolation precautions for identified infection/condition  Outcome: Progressing  Goal: Absence of fever/infection during neutropenic period  Description: INTERVENTIONS:  - Monitor WBC    Outcome: Progressing     Problem: SAFETY ADULT  Goal: Patient will remain free of falls  Description: INTERVENTIONS:  - Educate patient/family on patient safety including physical limitations  - Instruct patient to call for assistance with activity   - Consult OT/PT to assist with strengthening/mobility   - Keep Call bell within reach  - Keep bed low and locked with side rails adjusted as appropriate  - Keep care items and personal belongings within reach  - Initiate and maintain comfort rounds  - Make Fall Risk Sign visible to staff  - Apply yellow socks and bracelet  for high fall risk patients  - Consider moving patient to room near nurses station  Outcome: Progressing  Goal: Maintain or return to baseline ADL function  Description: INTERVENTIONS:  -  Assess patient's ability to carry out ADLs; assess patient's baseline for ADL function and identify physical deficits which impact ability to perform ADLs (bathing, care of mouth/teeth, toileting, grooming, dressing, etc.)  - Assess/evaluate cause of self-care deficits   - Assess range of motion  - Assess patient's mobility; develop plan if impaired  - Assess patient's need for assistive devices and provide as appropriate  - Encourage maximum independence but intervene and supervise when necessary  - Involve family in performance of ADLs  - Assess for home care needs following discharge   - Consider OT consult to assist with ADL evaluation and planning for discharge  - Provide patient education as appropriate  Outcome: Progressing  Goal: Maintains/Returns to pre admission functional level  Description: INTERVENTIONS:  - Perform AM-PAC 6 Click Basic Mobility/ Daily Activity assessment daily.  - Set and communicate daily mobility goal to care team and patient/family/caregiver.   - Collaborate with rehabilitation services on mobility goals if consulted  - Out of bed for toileting  - Record patient progress and toleration of activity level   Outcome: Progressing     Problem: DISCHARGE PLANNING  Goal: Discharge to home or other facility with appropriate resources  Description: INTERVENTIONS:  - Identify barriers to discharge w/patient and caregiver  - Arrange for needed discharge resources and transportation as appropriate  - Identify discharge learning needs (meds, wound care, etc.)  - Arrange for interpretive services to assist at discharge as needed  - Refer to Case Management Department for coordinating discharge planning if the patient needs post-hospital services based on physician/advanced practitioner order or complex needs  related to functional status, cognitive ability, or social support system  Outcome: Progressing     Problem: POSTPARTUM  Goal: Experiences normal postpartum course  Description: INTERVENTIONS:  - Monitor maternal vital signs  - Assess uterine involution and lochia  Outcome: Progressing  Goal: Appropriate maternal -  bonding  Description: INTERVENTIONS:  - Identify family support  - Assess for appropriate maternal/infant bonding   -Encourage maternal/infant bonding opportunities  - Referral to  or  as needed  Outcome: Progressing  Goal: Establishment of infant feeding pattern  Description: INTERVENTIONS:  - Assess breast/bottle feeding  - Refer to lactation as needed  Outcome: Progressing  Goal: Incision(s), wounds(s) or drain site(s) healing without S/S of infection  Description: INTERVENTIONS  - Assess and document dressing, incision, wound bed, drain sites and surrounding tissue  - Provide patient and family education  - Perform skin care/dressing changes every  Outcome: Progressing

## 2023-12-30 NOTE — LACTATION NOTE
"This note was copied from a baby's chart.  Discharge Lactaton: Mom is attempting to breastfeed on the right breast in cradle hold. Mom states baby is taking this breast longer than previous latches.       Upon assessment: latch is shallow with cheeks not touching the breast. Mom complains of nipple pain and thinks it is nipple tenderness during 1st 3 days. Demonstration and teach back of cross cradle hold with pillows supporting mom's arms.     Deep latch achieved. Demonstration and teach back of flanging upper and lower lips. Demonstration and teach back of breast compressions during the feeding. Pillows used to support mom's wrist / elbow.     Baby unlatched herself on the right breast. Burping techniques reviewed.    Brought baby to the left breast in cross cradle hold. Deeper latch achieved with active, coordinated sucks. No audible swallows noted.    Upon Visual Assessment: Wide space between the breasts with small, darker areolas that appear bulbous bilaterally. Everted nipple, longer shank noted, round face bilaterally.    Slightly recessed chin on infant with \"sweet heart\" lower lip.    Ed. On how baby breathes at the breast.    Ed. On offering both breasts up to two times in a feeding session. Reviewed cluster feeding and various positions in RSB.    High level overview of D/C    Called baby and me and left vm to schedule mom.    Provided education on growth spurts, when to introduce bottles; paced bottle feeding, and non-nutritive suck at the breast. Provided education on Signs of satiation. Encouraged to call lactation to observe a latch prior to discharge for reassurance. Encouraged to call baby and me with any questions and closely monitor output.    Provided demonstration, education and support of deep latch to breast by placing the nipple to the nose, dragging down to chin to achieve a wide latch. Bring baby to the breast, not breast to baby. Move your shoulders down and away from your ears. Look for " ear, shoulder, hip alignment. Baby's upper and lower lip should be flanged on the breast.    Assistance with flanging baby's lips on the breast. Demonstration and teach-back of tugging on chin to flange lower lip and pressing digit into the breast, rolling digit under the upper lip and away from the oral cavity to flange upper lip while baby is latched. Education on how to achieve flanged lips with initial latch and positioning to the breast. Encouraged to un-latch and re-latch baby if mom has pain, discomfort, or if latch appears shallow. Encouraged to call lactation for assistance.     Demonstrated with teach-back various burping techniques and positions with parent. Ed. On burping between each breast during a feeding session. Ed. On how to use burping techniques during Stage 1 of Lactogenesis and into Stage 2 of Lactogenesis. Enc. To offer both breasts at every feeding.    Demonstrated with teach back breast compressions during a feeding to increase milk transfer and stimulate suckling after a breathing/muscle break.     To assist with deep latch to the breast with a visible recessed chin,have baby's chin pressed deeply into the breast tissue. Use compression with hand between the shoulder blades to aid in active, coordinated jaw movement.     All babies are born to breastfeed due to upturned nose and flared nostrils. Mom will always see tip of nose. Babies will unlatch when they can't breathe.     Provided education on alignment of nose to breast; bring baby to breast and not breast to baby; support head with opp. Hand in cross cradle; use pillows to lift baby to be belly to belly; ear, shoulder, hip alignment; Support mother's back and place self in comfortable position to support bringing baby to the breast. Shoulders should be down and away from ears.

## 2024-01-02 NOTE — UTILIZATION REVIEW
"  MOTHER AND BABY DISCHARGED DEC 30    NOTIFICATION OF INPATIENT ADMISSION   MATERNITY/DELIVERY AUTHORIZATION REQUEST   SERVICING FACILITY:   [facility] - L&D, , NICU  [address]. [place] [id number]  Tax ID: [id number]  NPI: [id number]   ATTENDING PROVIDER:  Attending Name and NPI#: Rika Burnette Md [[id number]]  Address: [address] [place] [address]. [place] [address]  Phone: [phone]   ADMISSION INFORMATION:  Place of Service: Inpatient [facility]  Place of Service Code: 21  Inpatient Admission Date/Time: 23  8:32 PM  Discharge Date/Time: 2023  2:00 PM  Admitting Diagnosis Code/Description:  Encounter for induction of labor [Z34.90]  Encounter for full-term uncomplicated delivery [O80]     Mother: Tabitha Jaimes 1990 Estimated Date of Delivery: 23  Delivering clinician: Rika Burnette    OB History          1    Para   1    Term   1            AB        Living   1         SAB        IAB        Ectopic        Multiple   0    Live Births   1                Name & MRN:   Information for the patient's :  Allyssa Emmanuel Pat [[id number]]    Delivery Information:  Sex: female  Delivered 2023 5:56 PM by Vaginal, Spontaneous; Gestational Age: 41w3d    [place] Measurements:  Weight: 7 lb 7.9 oz (3400 g);  Height: 20.5\"    APGAR 1 minute 5 minutes 10 minutes   Totals: 9 9      [place] Birth Information: 33 y.o. female MRN: [id number] Unit/Bed#: -01   Birthweight: No birth weight on file. Gestational Age: <None> Delivery Type:    APGARS Totals:        UTILIZATION REVIEW CONTACT:  Nona Wilson, Utilization   Network Utilization Review Department  Phone: [phone]  Fax [phone]  Email: [email or web address]  Contact for approvals/pending authorizations, clinical reviews, and discharge.     PHYSICIAN ADVISORY SERVICES:  Medical Necessity Denial " & Sxbq-tq-Sajl Review  Phone: [phone]  Fax: [phone]  Email: [email or web address]     DISCHARGE SUPPORT TEAM:  For Patients Discharge Needs & Updates  Phone: [phone] opt. 2 Fax: [phone]  Email: [email or web address]      Discharge Summary - OB/GYN  Tabitha Jaimes 33 y.o. female MRN: [id number]  Unit/Bed#: -01 Encounter: [id number]     Admission Date: 2023      Discharge Date: 2023     Admitting Attending: Braden Estevez Attending: Yomaira     Discharging Attending: Jet     Principal Diagnosis: Pregnancy at 41w3d     Secondary Diagnosis:   1. PCOS  2. Weak D positivity      Procedures: spontaneous vaginal delivery     Anesthesia: local     Hospital course: Ms. Tabitha Jaimes is a 33 y.o.  at 41w2d. She presented to labor and delivery for induction of labor. Her pregnancy was complicated by weak D positivity, transfer of care from [place], and PCOS. On exam on arrival she was noted to be 1/70/-2. She was induced with cytotec, grimaldo balloon, and pitocin. SROM for clear fluid at 1748. She progressed to complete and started pushing.     She delivered a viable female  on 2023 at 1756. Weight 7lbs 7.9oz via normal spontaneous vaginal delivery. She sustained a 2nd degree perineal laceration during delivery which was adequately repaired. Apgars were 9 (1 min) and 9 (5 min).  was transferred to  nursery. Patient tolerated the procedure well.      Her post-delivery course was uncomplicated. Her postpartum pain was well controlled with oral analgesics. She received rhogam.      On day of discharge, she was ambulating and able to reasonably perform all ADLs. She was voiding and had appropriate bowel function. Pain was well controlled. She was discharged home on postpartum day #2 without complications. Patient was instructed to follow up with her OB as an outpatient and was given appropriate warnings to call provider if she develops  signs of infection or uncontrolled pain.     Complications: none apparent     Condition at discharge: good      Discharge instructions/Information to patient and family:   See after visit summary for information provided to patient and family.       Provisions for Follow-Up Care:  See after visit summary for information related to follow-up care and any pertinent home health orders.       Disposition: See After Visit Summary for discharge disposition information.     Planned Readmission: No     Discharge medications and instructions:   Please see AVS for full list of medications upon discharge.        Carmen Weber MD  PGY-1 OB/GYN               Cosigned by: Val Chaudhary MD at 12/30/2023  8:13 AM     Revision History

## 2024-01-03 LAB — PLACENTA IN STORAGE: NORMAL

## 2024-01-08 LAB — SCAN RESULT: NORMAL

## 2024-01-10 ENCOUNTER — OFFICE VISIT (OUTPATIENT)
Dept: POSTPARTUM | Facility: CLINIC | Age: 34
End: 2024-01-10
Payer: COMMERCIAL

## 2024-01-10 PROCEDURE — 99211 OFF/OP EST MAY X REQ PHY/QHP: CPT | Performed by: PEDIATRICS

## 2024-01-10 NOTE — PROGRESS NOTES
"INITIAL BREAST FEEDING EVALUATION    Informant/Relationship: Prince (mom/self) and Jovany (FOB)     Discussion of General Lactation Issues: Family is having some difficulty with breastfeeding. Mom concerns she has a low supply. Baby lost about 14% of her birthweight and was not making regular diapers.     She did have lactation support in the hospital. Baby latched right away and Mom thought she was doing well with latching, but is very sleepy at the breast .     Infant is 13 days old today.        History:  Fertility Problem:no Mom has PCOS but became pregnant on her own, as a surprise.   Breast changes:yes - first symptom of pregnancy was breast tenderness, enlargement    :  induced with Cytotec and grimaldo bulb and pitocin, she did not get an epidural. Pushed for 10-15 minutes   Full term:yes - 41 and 3    labor:no  First nursing/attempt < 1 hour after birth:yes - latched right away   Skin to skin following delivery:yes - immediately after delivery   Breast changes after delivery: Mom states she did not feed the \"hard, full\" feeling, some fullness at times and sensitivity.   Rooming in (infant in room with mother with exception of procedures, eg. Circumcision: yes - entire stay   Blood sugar issues:no  NICU stay:no  Jaundice:yes - but was not treated or supplemented for this   Phototherapy:no  Supplement given: (list supplement and method used as well as reason(s):yes - supplementation started after first Ped visit where she lost weight.     Past Medical History:   Diagnosis Date    PCOS (polycystic ovarian syndrome)     dx in early 's    Varicella     had chicken pox         Current Outpatient Medications:     cetirizine (ZyrTEC) 10 mg tablet, Take 10 mg by mouth daily, Disp: , Rfl:     fluticasone (FLONASE) 50 mcg/act nasal spray, 1 spray into each nostril daily, Disp: , Rfl:     MAGNESIUM GLYCINATE PO, Take by mouth, Disp: , Rfl:     Prenatal Vit-Fe Fumarate-FA (PRENATAL VITAMIN PO), Take by " mouth, Disp: , Rfl:     No Known Allergies    Social History     Substance and Sexual Activity   Drug Use Never       Social History     Interval Breastfeeding History:    Frequency of breast feeding: at each feeding, every 2-3 hours sometimes 4 hours at night   Does mother feel breastfeeding is effective: No  Does infant appear satisfied after nursing:No  Stooling pattern normal: lYes  Urinating frequently:Yes  Using shield or shells: No    Alternative/Artificial Feedings:   Bottle: Yes, Dr. Solorzanos,  rate, most of the time family is doing pacing with the bottle feedings   Cup: No  Syringe/Finger: No           Formula Type: Good Start formula                      Amount: 1.5-3 ounces depending on how she does at the breast  (15 oz total in 24 hours typically)             Breast Milk:                      Amount: she is not getting large volumes with pumping - adding into formula bottles             Frequency Q 2-3 hours during the day, 3-4 hours at night   Elimination Problems: No      Equipment:    Pump            Type: Haakaa and Spectra             Frequency of Use: she's pumped inconsistently, mostly using haakaa. 1-2 times per day    Equipment Problems: yes spectra pump doesn't seem to be working well. The most she's expressed from the haakaa is 5 mL.     Mom:  Breast: small, soft, wide spacing. Bulbous areolas   Nipple Assessment in General: Normal: elongated/eraser, no discoloration and no damage noted.  Mother's Awareness of Feeding Cues                 Recognizes: Yes - but report that baby is very sleepy                   Verbalizes: Yes  Support System: good support, FOB, MGM   History of Breastfeeding: first time breastfeeding   Changes/Stressors/Violence: Baby is not gaining weight with just breastfeeding, Mom is concerned about her supply.   Concerns/Goals: Mom's goal is to exclusively breastfeed. She does not want to continue with formula supplementation if possible.     Problems with Mom: low  milk supply     Physical Exam  Constitutional:       Appearance: Normal appearance.   Pulmonary:      Effort: Pulmonary effort is normal.   Musculoskeletal:         General: Normal range of motion.      Cervical back: Normal range of motion.   Neurological:      General: No focal deficit present.      Mental Status: She is alert and oriented to person, place, and time.   Skin:     General: Skin is warm.      Capillary Refill: Capillary refill takes less than 2 seconds.   Psychiatric:         Mood and Affect: Mood normal.         Behavior: Behavior normal.         Thought Content: Thought content normal.       Infant:  Behaviors: Sleepy  Color: Pink  Birth weight: 3400 g   Current weight: 3230 g     Problems with infant: not gaining weight well with only breastfeeding.       General Appearance:  Alert, active, no distress                            Head:  Normocephalic, AFOF, sutures opposed                            Eyes:   Conjunctiva clear, no drainage                            Ears:   Normally placed, no anomolies                           Nose:   Septum intact, no drainage or erythema                          Mouth:  No lesions. Tongue extends past lip line, limited lateralization, tongue tip is rounded upon lift and frenulum connects at the base of the tongue and well behind alveolar ridge. Good cup on gloved finger with peristalsis.                    Neck:  Supple, symmetrical, trachea midline                Respiratory:  No grunting, flaring, retractions, breath sounds clear and equal           Cardiovascular:  Regular rate and rhythm. No murmur. Adequate perfusion/capillary refill. Femoral pulse present                  Abdomen:    Soft, non-tender, no masses, bowel sounds present, no HSM            Genitourinary:  Normal female genitalia, anus patent                         Spine:   No abnormalities noted       Musculoskeletal:   Full range of motion         Skin/Hair/Nails:   Skin warm, dry, and intact,  no rashes or abnormal dyspigmentation or lesions               Neurologic:   No abnormal movement, tone appropriate for gestational age    [place] Latch:  Efficiency:               Lips Flanged: Yes              Depth of latch: wide latch               Audible Swallow: Yes, before SNS she was swallowing when actively sucking, but this was limited. Regular active swallows with SNS at the breast               Visible Milk: Yes, when hand expressed               Wide Open/ Asymmetrical: Yes              Suck Swallow Cycle: Breathing: yes, Coordinated: yes  Nipple Assessment after latch: Normal: elongated/eraser, no discoloration and no damage noted.  Latch Problems: Baby latches well. Initially is sleepy and starts and stops the feeding often. A few minutes in and with some breast compressions, baby begins active sucks and swallows more regularly. She does suck about 4-5 times before swallowing. Introduced an SNS at the breast and demonstrated how to assemble and latch baby to this properly. She feeds more actively with the SNS.     Position:  Infant's Ergonomics/Body               Body Alignment: Yes               Head Supported: Yes               Close to Mom's body/ Lifted/ Supported: Yes               Mom's Ergonomics/Body: Yes                           Supported: Yes                           Sitting Back: Yes                           Brings Baby to her breast: Yes  Positioning Problems: Mom reminded to place baby belly to belly and reviewed good body alignment and head angle. She is receptive to teaching and baby establishes a deep latch. Both appear comfortable.       Handouts:   None today     Education:  Reviewed Latch: importance of deep latch without pain.   Reviewed Positioning for Dyad: proper alignment and head angle when positioning at the breast   Reviewed Frequency/Supply & Demand: offer the breast at each feeding, pump if baby is not latching and effective transferring milk.   Reviewed Infant:Cues  and varied States of Awareness: watch for hunger cues, feed on demand. If baby seems satisfied at the breast (calm, relaxed sleeping, breasts are softer) no need to pump or supplement   Reviewed Infant Elimination: goal of 6+ wets and 2-3 stools per day   Reviewed Alternative/Artificial Feedings: paced bottle feeding technique demonstrated  Reviewed Mom/Breast care: gentle handling of the breast at all times  Reviewed Equipment: Hand pump and electric pump general guidance, Discussed proper flange fit, measured and discussed sizing        Plan:      Reassurance provided that baby is growing well at this time. Cont with positioning adjustments when latching and watch for signs of effective feeding. Pump if wanting to replace feeding at the breast with bottle feeding or if latching becomes painful. Gentle handling of the breast at all times to preserve integrity.  Contact [facility] for breastfeeding support as needed or ongoing concerns with latching comfort and milk transfer. Follow up with Dr. Jackson as scheduled.     I have spent 90 minutes with Patient and family today in which greater than 50% of this time was spent in counseling/coordination of care regarding Patient and family education.

## 2024-01-10 NOTE — PATIENT INSTRUCTIONS
-Watch for signs throughout the feeding that baby is effectively removing milk. You will feel strong tugging, hear swallowing and will feel your breasts get softer after nursing.   -No need to pump if baby is latching and feeding well at the breast on demand - or if supplementing with SNS at the breast.   -Pump as needed for missed feedings at the breast or for feedings where you supplement via bottle.  Utilize flange fit and settings that are comfortable for you, pumping should not be painful!   -Pump utilizing cycle pumping as discussed, when possible for additional stimulation. Utilize flange fit and settings that are comfortable for you, pumping should not be painful!  -Your nipples measured today at 15 mm on each side.  What's My Breast Size?  Flange Guide  Spectra [employer]   -follow up with lactation as needed for ongoing support    -follow up with Dr. Jackson for further discussion on how to support your production.

## 2024-01-12 NOTE — PROGRESS NOTES
I have reviewed the notes, assessments, and/or procedures performed by Lori Payne RN, IBCLC, I concur with her/his documentation of Tabitha Jackson MD 01/12/24

## 2024-01-23 ENCOUNTER — OFFICE VISIT (OUTPATIENT)
Dept: POSTPARTUM | Facility: CLINIC | Age: 34
End: 2024-01-23
Payer: COMMERCIAL

## 2024-01-23 DIAGNOSIS — E28.2 PCOS (POLYCYSTIC OVARIAN SYNDROME): ICD-10-CM

## 2024-01-23 DIAGNOSIS — O92.79 INSUFFICIENT LACTATION: ICD-10-CM

## 2024-01-23 DIAGNOSIS — N64.82 BREAST HYPOPLASIA: Primary | ICD-10-CM

## 2024-01-23 PROCEDURE — 99211 OFF/OP EST MAY X REQ PHY/QHP: CPT | Performed by: PEDIATRICS

## 2024-01-23 NOTE — PROGRESS NOTES
BREAST FEEDING FOLLOW UP VISIT    Informant/Relationship: Tabitha and Samer/mom and dad    Discussion of General Lactation Issues: Tabitha is feeding Allyssa with the SNS several times/day. She usually offers the breast first. Allyssa is very happy to attach to the breast and just hang out. She will do the same when the SNS in place. Allyssa is more willing to drink from the bottle with paced feeding or side-lying paced feeding.    Tabitha really wishes to increase her milk production and is interested in using some supplements. She is waiting to learn more.    Tabitha was eating a high protein and high fat diet and that was when her menses regulated and she became pregnant. She takes ovasitole and this maintains her menses and avoids bad cramping. She had GI symptoms with the metformin.   Tabitha had breast pain as her first symptoms of pregnancy. She had an elevated prolactin for a while prior to the pregnancy and was prescribed cabergoline. Tabitha continued to have breast tenderness and had some breast growth.     Infant is 26 days old today.    Interval Breastfeeding History:    Frequency of breast feeding: offering the breast at every feeding, about every 2-4 hours  Does mother feel breastfeeding is effective: If no, explain: still needs to be given a bottle  Does infant appear satisfied after nursing:If no, explain: still needs to be given a bottle  Stooling pattern normal:Yes  Urinating frequently:Yes  Using shield or shells:No    Alternative/Artificial Feedings:   Bottle: Yes, paced bottle feeding  Cup: No  Syringe/Finger: No           Formula Type: Jayesh Good Start Gentle Pro                     Amount: 20 oz/day            Breast Milk:                      Amount: whatever is available and direct feeding            Frequency Q 2-3 Hr between feedings; about every 4 at night  Elimination Problems: No      Equipment:  Nipple Shield             Type: n/a             Size: n/a             Frequency of Use:  n/a  Pump            Type: Spectra S2            Frequency of Use: several times/day; may get as much as 5 ml; got 10 ml once yesterday  Shells            Type: n/a            Frequency of use: n/a    Equipment Problems: yes not getting much milk      Mom:  Breast: Small, widely spaced, large areola for size of the breast; soft, somewhat conical in shape  Nipple Assessment in General: Normal: elongated/eraser, no discoloration and no damage noted.  Mother's Awareness of Feeding Cues                 Recognizes: Yes                  Verbalizes: Yes  Support System: FOB  History of Breastfeeding: none  Changes/Stressors/Violence: early weight loss, baby's need for supplementation  Concerns/Goals: Tabitha wishes to know that Allyssa is growing well and provide as much of her milk as she can    Problems with Mom: insufficient milk production    Physical Exam  Constitutional:       Appearance: Normal appearance. She is well-developed and normal weight.   HENT:      Head: Normocephalic and atraumatic.   Eyes:      Extraocular Movements: Extraocular movements intact.   Neck:      Thyroid: No thyromegaly.   Cardiovascular:      Rate and Rhythm: Normal rate and regular rhythm.      Pulses: Normal pulses.      Heart sounds: Normal heart sounds. No murmur heard.  Pulmonary:      Effort: Pulmonary effort is normal.      Breath sounds: Normal breath sounds.   Musculoskeletal:         General: No swelling or tenderness. Normal range of motion.      Cervical back: Normal range of motion and neck supple.      Right lower leg: No edema.      Left lower leg: No edema.   Lymphadenopathy:      Cervical: No cervical adenopathy.      Upper Body:      Right upper body: No pectoral adenopathy.      Left upper body: No pectoral adenopathy.   Neurological:      General: No focal deficit present.      Mental Status: She is alert and oriented to person, place, and time.   Psychiatric:         Mood and Affect: Mood normal.         Behavior:  Behavior normal.         Thought Content: Thought content normal.         Judgment: Judgment normal.   Vitals and nursing note reviewed.         Infant:  Behaviors: Alert  Color: Healthy  Birth weight: 3.4 kg  Current weight: 3.765 kg    Problems with infant: slight restricted milk production      General Appearance:  Alert, active, no distress                            Head:  Normocephalic, AFOF, sutures opposed                            Eyes:   Conjunctiva clear, no drainage                            Ears:   Normally placed, no anomolies                           Nose:   Septum intact, no drainage or erythema                          Mouth:  No lesions; very slight dimpling noted in the tip of the tongue with extension, lift, and lateralization all of which are normal; there is good cupping of the the examiner's finger with some pursing of the lips; there is good peristalsis; frenulum is attached near the base of the tongue and low on the inferior alveolar ridge                   Neck:  Supple, symmetrical, trachea midline, no adenopathy; thyroid: no enlargement, symmetric, no tenderness/mass/nodules                Respiratory:  No grunting, flaring, retractions, breath sounds clear and equal           Cardiovascular:  Regular rate and rhythm. No murmur. Adequate perfusion/capillary refill. Femoral pulse present                  Abdomen:    Soft, non-tender, no masses, bowel sounds present, no HSM            Genitourinary:  Normal female genitalia, anus patent                         Spine:   No abnormalities noted       Musculoskeletal:   Full range of motion         Skin/Hair/Nails:   Skin warm, dry, and intact, no rashes or abnormal dyspigmentation or lesions               Neurologic:   No abnormal movement, tone appropriate for gestational age     Latch:  Efficiency:               Lips Flanged: Yes              Depth of latch: Very good              Audible Swallow: No              Visible Milk: No               Wide Open/ Asymmetrical: Yes              Suck Swallow Cycle: Breathing: Unlabored, Coordinated: Yes  Nipple Assessment after latch: Normal: elongated/eraser, no discoloration and no damage noted.  Latch Problems: none, but no real effective SSB noted when Allyssa is attached    Position:  Infant's Ergonomics/Body               Body Alignment: Yes               Head Supported: Yes               Close to Mom's body/ Lifted/ Supported: Yes               Mom's Ergonomics/Body: Yes                           Supported: Yes                           Sitting Back: Yes                           Brings Baby to her breast: Yes  Positioning Problems: None        Education:  Reviewed Frequency/Supply & Demand: Reviewed the typical expected process with demand and production  Reviewed Alternative/Artificial Feedings: Paced feeding  Reviewed Mom/Breast care: Discussed the use of appropriate supplements that may help build milk production; encouraged additional milk expression as is comfortable; offer the breast as often as is comfortable both physically and emotionally  Reviewed Equipment: Discussed appropriate fitting of a flange      Plan:  Discussed history and physical exams with Tabitha and her . Tabitha has a complicated  and medical history that may have several negative impacts on her ability to provide breast milk. 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    Tabitha's history of PCOS increases the likelihood of difficulty conceiving (although this was not the case with some dietary changes) and difficulty producing milk. She has had issues with glucose metabolism and takes an inositol combination having not tolerated metformin in the past. This history increases the likelihood that milk production may less successful.   Further, Tabitha has a history of elevated prolactin. Elevated prolactin levels prior to and during pregnancy have been associated with lower milk production post  delivery. This is especially true when the prolactinemia has been treated with cabergoline. While it is true that Tabitha only took 2 doses, it is not clear what the role of hyperprolactemia and/or cabergoline use is in decreasing milk production, so either or both of these could have impacted her production negatively.   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 This indicates that she has less than typical breast tissue.    Recommended continuing the inositol containing supplement and adding galega (Goat's Rue) as both have been shown to have potential positive effect on building additional breast tissue and increasing milk production. The increase is small and occurs slowly, but may make a difference. These are the best supplement options as metformin has not been well tolerated in the past.   Also recommended continued offering the breast. Discussed the additional benefits of nursing, such as bonding and non-nutritional benefits of breastfeeding which may be decreased but still transfer. Encouraged a focus on meeting the baby's nutritional needs and both her own and the babies emotional needs.    Recommended meeting with behavioral health to address the stress created by losing the expected breastfeeding journey.   Additional support remains available.     I have spent 60 minutes with Patient and family today in which greater than 50% of this time was spent in counseling/coordination of care regarding Prognosis, Risks and benefits of tx options, Instructions for management, Patient and family education, Importance of tx compliance, Risk factor reductions, Impressions, Counseling / Coordination of care, Documenting in the medical record, and Obtaining or reviewing history  .

## 2024-01-23 NOTE — PATIENT INSTRUCTIONS
Offer the breast as often as you feel comfortable with or without the SNS.     Express breast milk as often as you feel you have the time, patience, and energy. You do not have to do this at all.     Add Mother Love Goat's Rue per the instructions on the bottle. You may see a small, slow increase in your milk production.    Breastfeeding is so much more than feeding at the breast and can be used for bonding and comfort (hers and yours). Any of your milk that she gets has benefits, even if it is only a few ml every few days. As long as it is worth it to you to offer, keep doing so.

## 2024-01-24 ENCOUNTER — TELEPHONE (OUTPATIENT)
Dept: PSYCHIATRY | Facility: CLINIC | Age: 34
End: 2024-01-24

## 2024-01-24 NOTE — TELEPHONE ENCOUNTER
----- Message from Rabia Guevara sent at 1/23/2024  4:11 PM EST -----  Regarding: Therapy visit  Good afternoon,    Please schedule this patient with provider who can see [facility] patients.    Thank you,  [facility]

## 2024-01-24 NOTE — TELEPHONE ENCOUNTER
Reached out to patient in regards to [employer] received spoke with pt offered an appointment at our [place] location, pt advised would rather stay closer to where she lives preferred the [place] location. Writer informed pt would have to be placed on wait-list due to no available appointments at this time. Pt verbalized she understood, writer made pt aware intake dept will reach out to her accordingly upon availability and placed pt on Epic Wait-list.

## 2024-02-08 ENCOUNTER — POSTPARTUM VISIT (OUTPATIENT)
Dept: OBGYN CLINIC | Facility: CLINIC | Age: 34
End: 2024-02-08
Payer: COMMERCIAL

## 2024-02-08 VITALS
SYSTOLIC BLOOD PRESSURE: 102 MMHG | BODY MASS INDEX: 25.64 KG/M2 | HEIGHT: 68 IN | WEIGHT: 169.2 LBS | DIASTOLIC BLOOD PRESSURE: 60 MMHG

## 2024-02-08 PROBLEM — Z34.83 ENCOUNTER FOR SUPERVISION OF OTHER NORMAL PREGNANCY, THIRD TRIMESTER: Status: RESOLVED | Noted: 2023-11-06 | Resolved: 2024-02-08

## 2024-02-08 PROBLEM — O26.893 RH NEGATIVE STATE IN ANTEPARTUM PERIOD, THIRD TRIMESTER: Status: RESOLVED | Noted: 2023-10-11 | Resolved: 2024-02-08

## 2024-02-08 PROBLEM — Z67.91 RH NEGATIVE STATE IN ANTEPARTUM PERIOD, THIRD TRIMESTER: Status: RESOLVED | Noted: 2023-10-11 | Resolved: 2024-02-08

## 2024-02-08 NOTE — PROGRESS NOTES
Assessment/Plan     Normal postpartum exam.     1. Contraception: condoms and rhythm method   -Recommended to avoid short interval of pregnancy.  Discussed monitoring her menstrual cycles for at least 3 to 6 months before utilizing only the rhythm method as she is likely to have irregular cycles with breast-feeding.  2. Annual exam due in August.   3. Lactation consult, [facility] information discussed.   4. Increase activity as tolerated, may resume all normal activity.  5. Anticipated return to work: end of March        Subjective   Chief Complaint   Patient presents with    Postpartum Care     Pt is here for a post partum care visit. Dlvd on 23 at 41w3d  (7 lb 7.9 oz)F Vag-Spont.   Baby girl! Allyssa   Perineal lacerations: 2°  Form of feeding: Breast feeding and formula   Declines birth control, will be using Rhythm and condoms  Bowel and Urine movements normal  EPDS 3   Last pap -neg pap        Tabitha Jaimes is a 33 y.o.  female who presents for a postpartum visit.     Delivery Method: Vaginal, Spontaneous    Delivery Date and Time: 2023  5:56 PM   Delivery Attending: Rika Burnette   Gestational Age at delivery: 41w3d   Route of Delivery: Vaginal, Spontaneous   N/a Reason for  delivery:        Admitting Diagnoses:   Patient Active Problem List   Diagnosis     (spontaneous vaginal delivery)    Polycystic ovarian syndrome    Rh negative state in antepartum period, third trimester    Encounter for supervision of other normal pregnancy, third trimester    Panic disorder       Gestational Diabetes: no  Pregnancy Complications: Rh negative status    Anesthesia: None ,     Delivery: Vaginal, Spontaneous  at 2023  5:56 PM   Laceration: Perineal: 2°  Repaired? Yes     Baby's Name: Allyssa  Baby's Weight: 3400 g (7 lb 7.9 oz) ; 119.93     Apgar scores: 9  and 9  at 1 and 5 minutes, respectively  Breast or formula: Breastfeeding & formula  Pediatrician: Kyle  "Pediatrics    Bleeding no bleeding. Bowel function: normal. Bladder function: normal. The patient is not having any pain.     Patient has not been sexually active. Desired contraception method is condoms and rhythm method.     Postpartum depression screening: negative. EPDS : 3. She denies depression/anxiety/trouble sleeping.     Last Pap : 2022 ; no abnormalities      The following portions of the patient's history were reviewed and updated as appropriate: allergies, current medications, past family history, past medical history, past social history, past surgical history, and problem list.      Current Outpatient Medications:     MAGNESIUM GLYCINATE PO, Take by mouth, Disp: , Rfl:     Prenatal Vit-Fe Fumarate-FA (PRENATAL VITAMIN PO), Take by mouth, Disp: , Rfl:     cetirizine (ZyrTEC) 10 mg tablet, Take 10 mg by mouth daily (Patient not taking: Reported on 1/10/2024), Disp: , Rfl:     fluticasone (FLONASE) 50 mcg/act nasal spray, 1 spray into each nostril daily (Patient not taking: Reported on 1/10/2024), Disp: , Rfl:     No Known Allergies    Review of Systems  Review of Systems   Constitutional:  Negative for chills, diaphoresis and fever.   Eyes:  Negative for visual disturbance.   Respiratory:  Negative for shortness of breath.    Cardiovascular:  Negative for chest pain.   Gastrointestinal:  Negative for abdominal pain, constipation, diarrhea, nausea and vomiting.   Genitourinary:  Negative for difficulty urinating, pelvic pain, vaginal bleeding, vaginal discharge and vaginal pain.   Musculoskeletal:  Negative for back pain.   Neurological:  Negative for dizziness, weakness and headaches.         Objective      /60 (BP Location: Left arm, Patient Position: Sitting, Cuff Size: Standard)   Ht 5' 8\" (1.727 m)   Wt 76.7 kg (169 lb 3.2 oz)   LMP 03/04/2023   Breastfeeding Yes Comment: Breast feeding and fromula  BMI 25.73 kg/m²     Physical Exam  Constitutional:       General: She is not in acute " distress.     Appearance: Normal appearance. She is normal weight. She is not diaphoretic.   HENT:      Head: Normocephalic and atraumatic.   Pulmonary:      Effort: Pulmonary effort is normal. No respiratory distress.   Musculoskeletal:      Right lower leg: No edema.      Left lower leg: No edema.   Neurological:      Mental Status: She is alert and oriented to person, place, and time.   Skin:     General: Skin is warm and dry.      Coloration: Skin is not pale.   Psychiatric:         Mood and Affect: Mood normal.         Behavior: Behavior normal.         Thought Content: Thought content normal.         Judgment: Judgment normal.   Vitals and nursing note reviewed.         Incision: n/a

## 2024-06-13 ENCOUNTER — VBI (OUTPATIENT)
Dept: ADMINISTRATIVE | Facility: OTHER | Age: 34
End: 2024-06-13

## 2024-06-13 NOTE — TELEPHONE ENCOUNTER
06/13/24 11:28 AM     Chart reviewed for Pap Smear (HPV) aka Cervical Cancer Screening ; nothing is submitted to the patient's insurance at this time.     Betsy Hinson   [facility]

## 2024-06-18 ENCOUNTER — OFFICE VISIT (OUTPATIENT)
Dept: FAMILY MEDICINE CLINIC | Facility: CLINIC | Age: 34
End: 2024-06-18
Payer: COMMERCIAL

## 2024-06-18 VITALS
BODY MASS INDEX: 26.07 KG/M2 | OXYGEN SATURATION: 98 % | HEART RATE: 75 BPM | WEIGHT: 172 LBS | SYSTOLIC BLOOD PRESSURE: 110 MMHG | HEIGHT: 68 IN | DIASTOLIC BLOOD PRESSURE: 74 MMHG | TEMPERATURE: 97.8 F

## 2024-06-18 DIAGNOSIS — Z13.220 ENCOUNTER FOR LIPID SCREENING FOR CARDIOVASCULAR DISEASE: ICD-10-CM

## 2024-06-18 DIAGNOSIS — Z00.00 ANNUAL PHYSICAL EXAM: ICD-10-CM

## 2024-06-18 DIAGNOSIS — Z13.6 ENCOUNTER FOR LIPID SCREENING FOR CARDIOVASCULAR DISEASE: ICD-10-CM

## 2024-06-18 DIAGNOSIS — Z76.89 ENCOUNTER TO ESTABLISH CARE WITH NEW DOCTOR: Primary | ICD-10-CM

## 2024-06-18 DIAGNOSIS — G89.29 CHRONIC BILATERAL LOW BACK PAIN WITHOUT SCIATICA: ICD-10-CM

## 2024-06-18 DIAGNOSIS — M54.50 CHRONIC BILATERAL LOW BACK PAIN WITHOUT SCIATICA: ICD-10-CM

## 2024-06-18 PROCEDURE — 99213 OFFICE O/P EST LOW 20 MIN: CPT | Performed by: FAMILY MEDICINE

## 2024-06-18 PROCEDURE — 99385 PREV VISIT NEW AGE 18-39: CPT | Performed by: FAMILY MEDICINE

## 2024-06-18 RX ORDER — MELOXICAM 7.5 MG/1
7.5 TABLET ORAL DAILY
Qty: 30 TABLET | Refills: 0 | Status: SHIPPED | OUTPATIENT
Start: 2024-06-18

## 2024-06-18 NOTE — PROGRESS NOTES
Adult Annual Physical  Name: Tabitha Jaimes      : 1990      MRN: [id number]  Encounter Provider: Carmen Lugo DO  Encounter Date: 2024   Encounter department: [place] [id number] [address]    Assessment & Plan   1. Encounter to establish care with new doctor  2. Annual physical exam  -     CBC and differential; Future  -     Comprehensive metabolic panel; Future  3. BMI 26.0-26.9,adult  -     CBC and differential; Future  -     Comprehensive metabolic panel; Future  4. Chronic bilateral low back pain without sciatica  -     Ambulatory Referral to Physical Therapy; Future  -     meloxicam (Mobic) 7.5 mg tablet; Take 1 tablet (7.5 mg total) by mouth daily  5. Encounter for lipid screening for cardiovascular disease  -     Lipid panel; Future    Immunizations and preventive care screenings were discussed with patient today. Appropriate education was printed on patient's after visit summary.    Counseling:  Alcohol/drug use: discussed moderation in alcohol intake, the recommendations for healthy alcohol use, and avoidance of illicit drug use.  Dental Health: discussed importance of regular tooth brushing, flossing, and dental visits.  Sexual health: discussed sexually transmitted diseases, partner selection, use of condoms, avoidance of unintended pregnancy, and contraceptive alternatives.  Exercise: the importance of regular exercise/physical activity was discussed. Recommend exercise 3-5 times per week for at least 30 minutes.       Depression Screening and Follow-up Plan: Patient was screened for depression during today's encounter. They screened negative with a PHQ-2 score of 0.      History of Present Illness     Adult Annual Physical:  Patient presents for annual physical. Back Pain   States that a few months ago, she work up with mild back pain and it has progressively worsened, she went to chiropractor and this has been helpful. States that he believes this was a  "herniated disc. Notes that this continues to be a problem intermittently. Notes that in 2016 she \"slipped a disc\" and this feels very similar.     Reports that she is taking it easy, doing stretches. This is helpful. .     Diet and Physical Activity:  - Diet/Nutrition: well balanced diet.  - Exercise: no formal exercise.    Depression Screening:  - PHQ-2 Score: 0    General Health:  - Sleep: sleeps well.  - Hearing: normal hearing bilateral ears.  - Vision: wears glasses and contacts, most recent eye exam < 1 year ago and goes for regular eye exams.  - Dental: brushes teeth twice daily and no dental visits for > 1 year. flossing daily    /GYN Health:  - Follows with GYN: yes.   - Menopause: premenopausal.   - History of STDs: no  - Contraception:. breastfeeding      Advanced Care Planning:  - Has an advanced directive?: no    - Has a durable medical POA?: no    - ACP document given to patient?: yes      Review of Systems      Objective     /74 (BP Location: Left arm, Patient Position: Sitting, Cuff Size: Standard)   Pulse 75   Temp 97.8 °F (36.6 °C) (Tympanic)   Ht 5' 8\" (1.727 m)   Wt 78 kg (172 lb)   SpO2 98%   BMI 26.15 kg/m²     Physical Exam  Vitals reviewed.   Constitutional:       General: She is not in acute distress.     Appearance: Normal appearance.   HENT:      Head: Normocephalic and atraumatic.      Right Ear: External ear normal.      Left Ear: External ear normal.      Nose: Nose normal.      Mouth/Throat:      Mouth: Mucous membranes are moist.   Eyes:      Extraocular Movements: Extraocular movements intact.      Conjunctiva/sclera: Conjunctivae normal.      Pupils: Pupils are equal, round, and reactive to light.   Cardiovascular:      Rate and Rhythm: Normal rate and regular rhythm.      Heart sounds: Normal heart sounds.   Pulmonary:      Effort: Pulmonary effort is normal.      Breath sounds: Normal breath sounds.   Abdominal:      General: Bowel sounds are normal. There is no " distension.      Palpations: Abdomen is soft.      Tenderness: There is no abdominal tenderness.   Musculoskeletal:      Cervical back: Neck supple.      Right lower leg: No edema.      Left lower leg: No edema.   Lymphadenopathy:      Cervical: No cervical adenopathy.   Skin:     General: Skin is warm.      Capillary Refill: Capillary refill takes less than 2 seconds.      Findings: No rash.   Neurological:      Mental Status: She is alert. Mental status is at baseline.       Administrative Statements         DO Ganesh Schroeder [facility]  6/18/2024 11:50 AM

## 2024-07-03 ENCOUNTER — OFFICE VISIT (OUTPATIENT)
Dept: DERMATOLOGY | Facility: CLINIC | Age: 34
End: 2024-07-03
Payer: COMMERCIAL

## 2024-07-03 VITALS — TEMPERATURE: 98.8 F | HEIGHT: 68 IN | WEIGHT: 165 LBS | BODY MASS INDEX: 25.01 KG/M2

## 2024-07-03 DIAGNOSIS — L82.1 SEBORRHEIC KERATOSIS: ICD-10-CM

## 2024-07-03 DIAGNOSIS — L81.4 LENTIGO: ICD-10-CM

## 2024-07-03 DIAGNOSIS — D22.9 MULTIPLE MELANOCYTIC NEVI: ICD-10-CM

## 2024-07-03 DIAGNOSIS — D48.9 NEOPLASM OF UNCERTAIN BEHAVIOR: Primary | ICD-10-CM

## 2024-07-03 DIAGNOSIS — L85.3 XEROSIS OF SKIN: ICD-10-CM

## 2024-07-03 DIAGNOSIS — D18.01 CHERRY ANGIOMA: ICD-10-CM

## 2024-07-03 PROCEDURE — 11102 TANGNTL BX SKIN SINGLE LES: CPT | Performed by: DERMATOLOGY

## 2024-07-03 PROCEDURE — 88305 TISSUE EXAM BY PATHOLOGIST: CPT | Performed by: STUDENT IN AN ORGANIZED HEALTH CARE EDUCATION/TRAINING PROGRAM

## 2024-07-03 PROCEDURE — 99204 OFFICE O/P NEW MOD 45 MIN: CPT | Performed by: DERMATOLOGY

## 2024-07-03 NOTE — PATIENT INSTRUCTIONS
"MELANOCYTIC NEVI (\"Moles\")      Assessment and Plan:  Based on a thorough discussion of this condition and the management approach to it (including a comprehensive discussion of the known risks, side effects and potential benefits of treatment), the patient (family) agrees to implement the following specific plan:  When outside we recommend using a wide brim hat, sunglasses, long sleeve and pants, sunscreen with SPF 30+ with reapplication every 2 hours, or SPF specific clothing   Benign, reassured  Annual skin check     Melanocytic Nevi  Melanocytic nevi (\"moles\") are tan or brown, raised or flat areas of the skin which have an increased number of melanocytes. Melanocytes are the cells in our body which make pigment and account for skin color.    Some moles are present at birth (I.e., \"congenital nevi\"), while others come up later in life (i.e., \"acquired nevi\").  The sun can stimulate the body to make more moles.  Sunburns are not the only thing that triggers more moles.  Chronic sun exposure can do it too.     Clinically distinguishing a healthy mole from melanoma may be difficult, even for experienced dermatologists. The \"ABCDE's\" of moles have been suggested as a means of helping to alert a person to a suspicious mole and the possible increased risk of melanoma.  The suggestions for raising alert are as follows:    Asymmetry: Healthy moles tend to be symmetric, while melanomas are often asymmetric.  Asymmetry means if you draw a line through the mole, the two halves do not match in color, size, shape, or surface texture. Asymmetry can be a result of rapid enlargement of a mole, the development of a raised area on a previously flat lesion, scaling, ulceration, bleeding or scabbing within the mole.  Any mole that starts to demonstrate \"asymmetry\" should be examined promptly by a board certified dermatologist.     Border: Healthy moles tend to have discrete, even borders.  The border of a melanoma often blends into " "the normal skin and does not sharply delineate the mole from normal skin.  Any mole that starts to demonstrate \"uneven borders\" should be examined promptly by a board certified dermatologist.     Color: Healthy moles tend to be one color throughout.  Melanomas tend to be made up of different colors ranging from dark black, blue, white, or red.  Any mole that demonstrates a color change should be examined promptly by a board certified dermatologist.     Diameter: Healthy moles tend to be smaller than 0.6 cm in size; an exception are \"congenital nevi\" that can be larger.  Melanomas tend to grow and can often be greater than 0.6 cm (1/4 of an inch, or the size of a pencil eraser). This is only a guideline, and many normal moles may be larger than 0.6 cm without being unhealthy.  Any mole that starts to change in size (small to bigger or bigger to smaller) should be examined promptly by a board certified dermatologist.     Evolving: Healthy moles tend to \"stay the same.\"  Melanomas may often show signs of change or evolution such as a change in size, shape, color, or elevation.  Any mole that starts to itch, bleed, crust, burn, hurt, or ulcerate or demonstrate a change or evolution should be examined promptly by a board certified dermatologist.        LENTIGO    Assessment and Plan:  Based on a thorough discussion of this condition and the management approach to it (including a comprehensive discussion of the known risks, side effects and potential benefits of treatment), the patient (family) agrees to implement the following specific plan:  When outside we recommend using a wide brim hat, sunglasses, long sleeve and pants, sunscreen with SPF 30+ with reapplication every 2 hours, or SPF specific clothing       What is a lentigo?  A lentigo is a pigmented flat or slightly raised lesion with a clearly defined edge. Unlike an ephelis (freckle), it does not fade in the winter months. There are several kinds of lentigo.  The " name lentigo originally referred to its appearance resembling a small lentil. The plural of lentigo is lentigines, although “lentigos” is also in common use.    Who gets lentigines?  Lentigines can affect males and females of all ages and races. Solar lentigines are especially prevalent in fair skinned adults. Lentigines associated with syndromes are present at birth or arise during childhood.    What causes lentigines?  Common forms of lentigo are due to exposure to ultraviolet radiation:  Sun damage including sunburn   Indoor tanning   Phototherapy, especially photochemotherapy (PUVA)    Ionizing radiation, eg radiation therapy, can also cause lentigines.  Several familial syndromes associated with widespread lentigines originate from mutations in Atilio-MAP kinase, mTOR signaling and PTEN pathways.    What is the treatment for lentigines?  Most lentigines are left alone. Attempts to lighten them may not be successful. The following approaches are used:  SPF 50+ broad-spectrum sunscreen   Hydroquinone bleaching cream   Alpha hydroxy acids   Vitamin C   Retinoids   Azelaic acid   Chemical peels  Individual lesions can be permanently removed using:  Cryotherapy   Intense pulsed light   Pigment lasers    How can lentigines be prevented?  Lentigines associated with exposure ultraviolet radiation can be prevented by very careful sun protection. Clothing is more successful at preventing new lentigines than are sunscreens.    What is the outlook for lentigines?  Lentigines usually persist. They may increase in number with age and sun exposure. Some in sun-protected sites may fade and disappear.    JUSTIN ANGIOMAS        Assessment and Plan:  Based on a thorough discussion of this condition and the management approach to it (including a comprehensive discussion of the known risks, side effects and potential benefits of treatment), the patient (family) agrees to implement the following specific plan:  Monitor for  "changes  Benign, reassured  For nose spot if interested in laser treatment please email [email or web address]       Assessment and Plan:    Cherry angioma, also known as Krueger de Sreekanth spots, are benign vascular skin lesions. A \"cherry angioma\" is a firm red, blue or purple papule, 0.1-1 cm in diameter. When thrombosed, they can appear black in colour until evaluated with a dermatoscope when the red or purple colour is more easily seen. Cherry angioma may develop on any part of the body but most often appear on the scalp, face, lips and trunk.  An angioma is due to proliferating endothelial cells; these are the cells that line the inside of a blood vessel.    Angiomas can arise in early life or later in life; the most common type of angioma is a cherry angioma.  Cherry angiomas are very common in males and females of any age or race. They are more noticeable in white skin than in skin of colour. They markedly increase in number from about the age of 40. There may be a family history of similar lesions. Eruptive cherry angiomas have been rarely reported to be associated with internal malignancy. The cause of angiomas is unknown. Genetic analysis of cherry angiomas has shown that they frequently carry specific somatic missense mutations in the GNAQ and GNA11 (Q209H) genes, which are involved in other vascular and melanocytic proliferations.      SEBORRHEIC KERATOSIS; NON-INFLAMED    Assessment and Plan:  Based on a thorough discussion of this condition and the management approach to it (including a comprehensive discussion of the known risks, side effects and potential benefits of treatment), the patient (family) agrees to implement the following specific plan:  Monitor for changes  Benign, reassured      Seborrheic Keratosis  A seborrheic keratosis is a harmless warty spot that appears during adult life as a common sign of skin aging.  Seborrheic keratoses can arise on any area of skin, covered or uncovered, with " "the usual exception of the palms and soles. They do not arise from mucous membranes. Seborrheic keratoses can have highly variable appearance.      Seborrheic keratoses are extremely common. It has been estimated that over 90% of adults over the age of 60 years have one or more of them. They occur in males and females of all races, typically beginning to erupt in the 30s or 40s. They are uncommon under the age of 20 years.  The precise cause of seborrhoeic keratoses is not known.  Seborrhoeic keratoses are considered degenerative in nature. As time goes by, seborrheic keratoses tend to become more numerous. Some people inherit a tendency to develop a very large number of them; some people may have hundreds of them.      There is no easy way to remove multiple lesions on a single occasion.  Unless a specific lesion is \"inflamed\" and is causing pain or stinging/burning or is bleeding, most insurance companies do not authorize treatment.    XEROSIS (\"DRY SKIN\")    Assessment and Plan:  Based on a thorough discussion of this condition and the management approach to it (including a comprehensive discussion of the known risks, side effects and potential benefits of treatment), the patient (family) agrees to implement the following specific plan:  Use moisturizer like Eucerin,Cerave or Aveeno Cream 3 times a day for the dry skin            Dry skin refers to skin that feels dry to touch. Dry skin has a dull surface with a rough, scaly quality. The skin is less pliable and cracked. When dryness is severe, the skin may become inflamed and fissured.  Although any body site can be dry, dry skin tends to affect the shins more than any other site.    Dry skin is lacking moisture in the outer horny cell layer (stratum corneum) and this results in cracks in the skin surface.  Dry skin is also called xerosis, xeroderma or asteatosis (lack of fat).  It can affect males and females of all ages. There is some racial variability in " "water and lipid content of the skin.  Dry skin that starts in early childhood may be one of about 20 types of ichthyosis (fish-scale skin). There is often a family history of dry skin.   Dry skin is commonly seen in people with atopic dermatitis.  Nearly everyone > 60 years has dry skin.    Dry skin that begins later may be seen in people with certain diseases and conditions.  Postmenopausal women  Hypothyroidism  Chronic renal disease   Malnutrition and weight loss   Subclinical dermatitis   Treatment with certain drugs such as oral retinoids, diuretics and epidermal growth factor receptor inhibitors      What is the treatment for dry skin?  The mainstay of treatment of dry skin and ichthyosis is moisturisers/emollients. They should be applied liberally and often enough to:  Reduce itch   Improve the barrier function   Prevent entry of irritants, bacteria   Reduce transepidermal water loss.      How can dry skin be prevented?  Eliminate aggravating factors:  Reduce the frequency of bathing.   A humidifier in winter and air conditioner in summer   Compare having a short shower with a prolonged soak in a bath.   Use lukewarm, not hot, water.   Replace standard soap with a substitute such as a synthetic detergent cleanser, water-miscible emollient, bath oil, anti-pruritic tar oil, colloidal oatmeal etc.   Apply an emollient liberally and often, particularly shortly after bathing, and when itchy. The drier the skin, the thicker this should be, especially on the hands.    What is the outlook for dry skin?  A tendency to dry skin may persist life-long, or it may improve once contributing factors are controlled.     NEOPLASM OF UNCERTAIN BEHAVIOR OF SKIN      Assessment and Plan:  I have discussed with the patient that a sample of skin via a \"skin biopsy” would be potentially helpful to further make a specific diagnosis under the microscope.  Based on a thorough discussion of this condition and the management approach to " it (including a comprehensive discussion of the known risks, side effects and potential benefits of treatment), the patient (family) agrees to implement the following specific plan:    Procedure:  Skin Biopsy.  After a thorough discussion of treatment options and risk/benefits/alternatives (including but not limited to local pain, scarring, dyspigmentation, blistering, possible superinfection, and inability to confirm a diagnosis via histopathology), verbal and written consent were obtained and portion of the rash was biopsied for tissue sample.  See below for consent that was obtained from patient and subsequent Procedure Note.     PROCEDURE TANGENTIAL (SHAVE) BIOPSY NOTE:    After obtaining informed consent  at which time there was a discussion about the purpose of biopsy  and low risks of infection and bleeding.  The area was prepped and draped in the usual fashion. Anesthesia was obtained with 1% lidocaine with epinephrine. A shave biopsy to an appropriate sampling depth was obtained by Shave (Dermablade or 15 blade) The resulting wound was covered with surgical ointment and bandaged appropriately.     The patient tolerated the procedure well without complications and was without signs of functional compromise.      Specimen has been sent for review by Dermatopathology.    Standard post-procedure care has been explained and has been included in written form within the patient's copy of Informed Consent.    INFORMED CONSENT DISCUSSION AND POST-OPERATIVE INSTRUCTIONS FOR PATIENT    I.  RATIONALE FOR PROCEDURE  I understand that a skin biopsy allows the Dermatologist to test a lesion or rash under the microscope to obtain a diagnosis.  It usually involves numbing the area with numbing medication and removing a small piece of skin; sometimes the area will be closed with sutures. In this specific procedure, sutures are not usually needed.  If any sutures are placed, then they are usually need to be removed in 2 weeks  "or less.    I understand that my Dermatologist recommends that a skin \"shave\" biopsy be performed today.  A local anesthetic, similar to the kind that a dentist uses when filling a cavity, will be injected with a very small needle into the skin area to be sampled.  The injected skin and tissue underneath \"will go to sleep” and become numb so no pain should be felt afterwards.  An instrument shaped like a tiny \"razor blade\" (shave biopsy instrument) will be used to cut a small piece of tissue and skin from the area so that a sample of tissue can be taken and examined more closely under the microscope.  A slight amount of bleeding will occur, but it will be stopped with direct pressure and a pressure bandage and any other appropriate methods.  I understands that a scar will form where the wound was created.  Surgical ointment will be applied to help protect the wound.  Sutures are not usually needed.    II.  RISKS AND POTENTIAL COMPLICATIONS   I understand the risks and potential complications of a skin biopsy include but are not limited to the following:  Bleeding  Infection  Pain  Scar/keloid  Skin discoloration  Incomplete Removal  Recurrence  Nerve Damage/Numbness/Loss of Function  Allergic Reaction to Anesthesia  Biopsies are diagnostic procedures and based on findings additional treatment or evaluation may be required  Loss or destruction of specimen resulting in no additional findings    My Dermatologist has explained to me the nature of the condition, the nature of the procedure, and the benefits to be reasonably expected compared with alternative approaches.  My Dermatologist has discussed the likelihood of major risks or complications of this procedure including the specific risks listed above, such as bleeding, infection, and scarring/keloid.  I understand that a scar is expected after this procedure.  I understand that my physician cannot predict if the scar will form a \"keloid,\" which extends beyond the " "borders of the wound that is created.  A keloid is a thick, painful, and bumpy scar.  A keloid can be difficult to treat, as it does not always respond well to therapy, which includes injecting cortisone directly into the keloid every few weeks.  While this usually reduces the pain and size of the scar, it does not eliminate it.      I understand that photographs may be taken before and after the procedure.  These will be maintained as part of the medical providers confidential records and may not be made available to me.  I further authorize the medical provider to use the photographs for teaching purposes or to illustrate scientific papers, books, or lectures if in his/her judgment, medical research, education, or science may benefit from its use.    I have had an opportunity to fully inquire about the risks and benefits of this procedure and its alternatives.   I have been given ample time and opportunity to ask questions and to seek a second opinion if I wished to do so.  I acknowledge that there have specifically been no guarantees as to the cosmetic results from the procedure.  I am aware that with any procedure there is always the possibility of an unexpected complication.    III. POST-PROCEDURAL CARE (WHAT YOU WILL NEED TO DO \"AFTER THE BIOPSY\" TO OPTIMIZE HEALING)    Keep the area clean and dry.  Try NOT to remove the bandage or get it wet for the first 24 hours.    Gently clean the area and apply surgical ointment (such as Vaseline petrolatum ointment, which is available \"over the counter\" and not a prescription) to the biopsy site for up to 2 weeks straight.  This acts to protect the wound from the outside world.      Sutures are not usually placed in this procedure.  If any sutures were placed, return for suture removal as instructed (generally 1 week for the face, 2 weeks for the body).      Take Acetaminophen (Tylenol) for discomfort, if no contraindications.  Ibuprofen or aspirin could make bleeding " worse.    Call our office immediately for signs of infection: fever, chills, increased redness, warmth, tenderness, discomfort/pain, or pus or foul smell coming from the wound.    WHAT TO DO IF THERE IS ANY BLEEDING?  If a small amount of bleeding is noticed, place a clean cloth over the area and apply firm pressure for ten minutes.  Check the wound after 10 minutes of direct pressure.  If bleeding persists, try one more time for an additional 10 minutes of direct pressure on the area.  If the bleeding becomes heavier or does not stop after the second attempt, or if you have any other questions about this procedure, then please call your [facility]'s Dermatologist by calling [phone] (SKIN).     I hereby acknowledge that I have reviewed and verified the site with my Dermatologist and have requested and authorized my Dermatologist to proceed with the procedure.

## 2024-07-03 NOTE — PROGRESS NOTES
"[facility]atology [facility] Note     Patient Name: Tabitha Jaimes  Encounter Date: 7/3/2024     Have you been cared for by a [facility] Dermatologist in the last 3 years and, if so, which description applies to you?    NO.   I am considered a \"new\" patient and must complete all patient intake questions. I am FEMALE/of child-bearing potential.    REVIEW OF SYSTEMS:  Have you recently had or currently have any of the following? Recent fever or chills? No  Any non-healing wound? No  Are you pregnant or planning to become pregnant? No  Are you currently or planning to be nursing or breast feeding? No   PAST MEDICAL HISTORY:  Have you personally ever had or currently have any of the following?  If \"YES,\" then please provide more detail. Skin cancer (such as Melanoma, Basal Cell Carcinoma, Squamous Cell Carcinoma?  No  Tuberculosis, HIV/AIDS, Hepatitis B or C: No  Radiation Treatment No   HISTORY OF IMMUNOSUPPRESSION:   Do you have a history of any of the following:  Systemic Immunosuppression such as Diabetes, Biologic or Immunotherapy, Chemotherapy, Organ Transplantation, Bone Marrow Transplantation?  No    Answering \"YES\" requires the addition of the dotphrase \"IMMUNOSUPPRESSED\" as the first diagnosis of the patient's visit.   FAMILY HISTORY:  Any \"first degree relatives\" (parent, brother, sister, or child) with the following?    Skin Cancer, Pancreatic or Other Cancer? YES, Father-Melanoma    PATIENT EXPERIENCE:    Do you want the Dermatologist to perform a COMPLETE skin exam today including a clinical examination under the \"bra and underwear\" areas?  Yes, underneath underwear not examined today   If necessary, do we have your permission to call and leave a detailed message on your Preferred Phone number that includes your specific medical information?  Yes      No Known Allergies   Current Outpatient Medications:     fluticasone (FLONASE) 50 mcg/act nasal spray, 1 spray into each nostril if needed for allergies " "Taking as needed, Disp: , Rfl:     MAGNESIUM GLYCINATE PO, Take by mouth, Disp: , Rfl:     cetirizine (ZyrTEC) 10 mg tablet, Take 10 mg by mouth daily (Patient not taking: Reported on 1/10/2024), Disp: , Rfl:     meloxicam (Mobic) 7.5 mg tablet, Take 1 tablet (7.5 mg total) by mouth daily (Patient not taking: Reported on 7/3/2024), Disp: 30 tablet, Rfl: 0    Prenatal Vit-Fe Fumarate-FA (PRENATAL VITAMIN PO), Take by mouth, Disp: , Rfl:           Whom besides the patient is providing clinical information about today's encounter?   NO ADDITIONAL HISTORIAN (patient alone provided history)    Physical Exam and Assessment/Plan by Diagnosis:  CHIEF COMPLAINT    34 year old male or female patient presents today for New Patient.  Patient has a No history of skin cancer     MELANOCYTIC NEVI (\"Moles\")    Physical Exam:  Anatomic Location Affected:   Mostly on sun-exposed areas of the trunk and extremities  Morphological Description:  Scattered, 1-4mm round to ovoid, symmetrical-appearing, even bordered, skin colored to dark brown macules/papules, mostly in sun-exposed areas  Pertinent Positives:  Pertinent Negatives:    Additional History of Present Condition:      Assessment and Plan:  Based on a thorough discussion of this condition and the management approach to it (including a comprehensive discussion of the known risks, side effects and potential benefits of treatment), the patient (family) agrees to implement the following specific plan:  When outside we recommend using a wide brim hat, sunglasses, long sleeve and pants, sunscreen with SPF 30+ with reapplication every 2 hours, or SPF specific clothing   Benign, reassured  Annual skin check     Melanocytic Nevi  Melanocytic nevi (\"moles\") are tan or brown, raised or flat areas of the skin which have an increased number of melanocytes. Melanocytes are the cells in our body which make pigment and account for skin color.    Some moles are present at birth (I.e., \"congenital " "nevi\"), while others come up later in life (i.e., \"acquired nevi\").  The sun can stimulate the body to make more moles.  Sunburns are not the only thing that triggers more moles.  Chronic sun exposure can do it too.     Clinically distinguishing a healthy mole from melanoma may be difficult, even for experienced dermatologists. The \"ABCDE's\" of moles have been suggested as a means of helping to alert a person to a suspicious mole and the possible increased risk of melanoma.  The suggestions for raising alert are as follows:    Asymmetry: Healthy moles tend to be symmetric, while melanomas are often asymmetric.  Asymmetry means if you draw a line through the mole, the two halves do not match in color, size, shape, or surface texture. Asymmetry can be a result of rapid enlargement of a mole, the development of a raised area on a previously flat lesion, scaling, ulceration, bleeding or scabbing within the mole.  Any mole that starts to demonstrate \"asymmetry\" should be examined promptly by a board certified dermatologist.     Border: Healthy moles tend to have discrete, even borders.  The border of a melanoma often blends into the normal skin and does not sharply delineate the mole from normal skin.  Any mole that starts to demonstrate \"uneven borders\" should be examined promptly by a board certified dermatologist.     Color: Healthy moles tend to be one color throughout.  Melanomas tend to be made up of different colors ranging from dark black, blue, white, or red.  Any mole that demonstrates a color change should be examined promptly by a board certified dermatologist.     Diameter: Healthy moles tend to be smaller than 0.6 cm in size; an exception are \"congenital nevi\" that can be larger.  Melanomas tend to grow and can often be greater than 0.6 cm (1/4 of an inch, or the size of a pencil eraser). This is only a guideline, and many normal moles may be larger than 0.6 cm without being unhealthy.  Any mole that starts " "to change in size (small to bigger or bigger to smaller) should be examined promptly by a board certified dermatologist.     Evolving: Healthy moles tend to \"stay the same.\"  Melanomas may often show signs of change or evolution such as a change in size, shape, color, or elevation.  Any mole that starts to itch, bleed, crust, burn, hurt, or ulcerate or demonstrate a change or evolution should be examined promptly by a board certified dermatologist.        LENTIGO    Physical Exam:  Anatomic Location Affected:  trunk, arms  Morphological Description:  Light brown macules  Pertinent Positives:  Pertinent Negatives:    Additional History of Present Condition:      Assessment and Plan:  Based on a thorough discussion of this condition and the management approach to it (including a comprehensive discussion of the known risks, side effects and potential benefits of treatment), the patient (family) agrees to implement the following specific plan:  When outside we recommend using a wide brim hat, sunglasses, long sleeve and pants, sunscreen with SPF 30+ with reapplication every 2 hours, or SPF specific clothing       What is a lentigo?  A lentigo is a pigmented flat or slightly raised lesion with a clearly defined edge. Unlike an ephelis (freckle), it does not fade in the winter months. There are several kinds of lentigo.  The name lentigo originally referred to its appearance resembling a small lentil. The plural of lentigo is lentigines, although “lentigos” is also in common use.    Who gets lentigines?  Lentigines can affect males and females of all ages and races. Solar lentigines are especially prevalent in fair skinned adults. Lentigines associated with syndromes are present at birth or arise during childhood.    What causes lentigines?  Common forms of lentigo are due to exposure to ultraviolet radiation:  Sun damage including sunburn   Indoor tanning   Phototherapy, especially photochemotherapy (PUVA)    Ionizing " "radiation, eg radiation therapy, can also cause lentigines.  Several familial syndromes associated with widespread lentigines originate from mutations in Atilio-MAP kinase, mTOR signaling and PTEN pathways.    What is the treatment for lentigines?  Most lentigines are left alone. Attempts to lighten them may not be successful. The following approaches are used:  SPF 50+ broad-spectrum sunscreen   Hydroquinone bleaching cream   Alpha hydroxy acids   Vitamin C   Retinoids   Azelaic acid   Chemical peels  Individual lesions can be permanently removed using:  Cryotherapy   Intense pulsed light   Pigment lasers    How can lentigines be prevented?  Lentigines associated with exposure ultraviolet radiation can be prevented by very careful sun protection. Clothing is more successful at preventing new lentigines than are sunscreens.    What is the outlook for lentigines?  Lentigines usually persist. They may increase in number with age and sun exposure. Some in sun-protected sites may fade and disappear.    JUSTIN ANGIOMAS    Physical Exam:  Anatomic Location Affected:  trunk  Morphological Description:  Scattered cherry red, 1-4 mm papules.  Pertinent Positives:  Pertinent Negatives:    Additional History of Present Condition:      Assessment and Plan:  Based on a thorough discussion of this condition and the management approach to it (including a comprehensive discussion of the known risks, side effects and potential benefits of treatment), the patient (family) agrees to implement the following specific plan:  Monitor for changes  Benign, reassured  For nose spot if interested in laser treatment please email [email or web address]       Assessment and Plan:    Cherry angioma, also known as Krueger de Sreekanth spots, are benign vascular skin lesions. A \"cherry angioma\" is a firm red, blue or purple papule, 0.1-1 cm in diameter. When thrombosed, they can appear black in colour until evaluated with a dermatoscope when the red or " "purple colour is more easily seen. Cherry angioma may develop on any part of the body but most often appear on the scalp, face, lips and trunk.  An angioma is due to proliferating endothelial cells; these are the cells that line the inside of a blood vessel.    Angiomas can arise in early life or later in life; the most common type of angioma is a cherry angioma.  Cherry angiomas are very common in males and females of any age or race. They are more noticeable in white skin than in skin of colour. They markedly increase in number from about the age of 40. There may be a family history of similar lesions. Eruptive cherry angiomas have been rarely reported to be associated with internal malignancy. The cause of angiomas is unknown. Genetic analysis of cherry angiomas has shown that they frequently carry specific somatic missense mutations in the GNAQ and GNA11 (Q209H) genes, which are involved in other vascular and melanocytic proliferations.      SEBORRHEIC KERATOSIS; NON-INFLAMED    Physical Exam:  Anatomic Location Affected:  trunk  Morphological Description:  Flat and raised, waxy, smooth to warty textured, yellow to brownish-grey to dark brown to blackish, discrete, \"stuck-on\" appearing papules.  Pertinent Positives:  Pertinent Negatives:    Additional History of Present Condition:      Assessment and Plan:  Based on a thorough discussion of this condition and the management approach to it (including a comprehensive discussion of the known risks, side effects and potential benefits of treatment), the patient (family) agrees to implement the following specific plan:  Monitor for changes  Benign, reassured      Seborrheic Keratosis  A seborrheic keratosis is a harmless warty spot that appears during adult life as a common sign of skin aging.  Seborrheic keratoses can arise on any area of skin, covered or uncovered, with the usual exception of the palms and soles. They do not arise from mucous membranes. Seborrheic " "keratoses can have highly variable appearance.      Seborrheic keratoses are extremely common. It has been estimated that over 90% of adults over the age of 60 years have one or more of them. They occur in males and females of all races, typically beginning to erupt in the 30s or 40s. They are uncommon under the age of 20 years.  The precise cause of seborrhoeic keratoses is not known.  Seborrhoeic keratoses are considered degenerative in nature. As time goes by, seborrheic keratoses tend to become more numerous. Some people inherit a tendency to develop a very large number of them; some people may have hundreds of them.      There is no easy way to remove multiple lesions on a single occasion.  Unless a specific lesion is \"inflamed\" and is causing pain or stinging/burning or is bleeding, most insurance companies do not authorize treatment.    XEROSIS (\"DRY SKIN\")    Physical Exam:  Anatomic Location Affected:  diffuse  Morphological Description:  xerosis  Pertinent Positives:  Pertinent Negatives:    Additional History of Present Condition:      Assessment and Plan:  Based on a thorough discussion of this condition and the management approach to it (including a comprehensive discussion of the known risks, side effects and potential benefits of treatment), the patient (family) agrees to implement the following specific plan:  Use moisturizer like Eucerin,Cerave or Aveeno Cream 3 times a day for the dry skin            Dry skin refers to skin that feels dry to touch. Dry skin has a dull surface with a rough, scaly quality. The skin is less pliable and cracked. When dryness is severe, the skin may become inflamed and fissured.  Although any body site can be dry, dry skin tends to affect the shins more than any other site.    Dry skin is lacking moisture in the outer horny cell layer (stratum corneum) and this results in cracks in the skin surface.  Dry skin is also called xerosis, xeroderma or asteatosis (lack of " fat).  It can affect males and females of all ages. There is some racial variability in water and lipid content of the skin.  Dry skin that starts in early childhood may be one of about 20 types of ichthyosis (fish-scale skin). There is often a family history of dry skin.   Dry skin is commonly seen in people with atopic dermatitis.  Nearly everyone > 60 years has dry skin.    Dry skin that begins later may be seen in people with certain diseases and conditions.  Postmenopausal women  Hypothyroidism  Chronic renal disease   Malnutrition and weight loss   Subclinical dermatitis   Treatment with certain drugs such as oral retinoids, diuretics and epidermal growth factor receptor inhibitors      What is the treatment for dry skin?  The mainstay of treatment of dry skin and ichthyosis is moisturisers/emollients. They should be applied liberally and often enough to:  Reduce itch   Improve the barrier function   Prevent entry of irritants, bacteria   Reduce transepidermal water loss.      How can dry skin be prevented?  Eliminate aggravating factors:  Reduce the frequency of bathing.   A humidifier in winter and air conditioner in summer   Compare having a short shower with a prolonged soak in a bath.   Use lukewarm, not hot, water.   Replace standard soap with a substitute such as a synthetic detergent cleanser, water-miscible emollient, bath oil, anti-pruritic tar oil, colloidal oatmeal etc.   Apply an emollient liberally and often, particularly shortly after bathing, and when itchy. The drier the skin, the thicker this should be, especially on the hands.    What is the outlook for dry skin?  A tendency to dry skin may persist life-long, or it may improve once contributing factors are controlled.     NEOPLASM OF UNCERTAIN BEHAVIOR OF SKIN    Physical Exam:  (Anatomic Location); (Size and Morphological Description); (Differential Diagnosis):  Specimen A: left upper arm; 0.5 cm x 0.7 cm crusted pink papule; Diff Dx:  "Dermatofibroma vs Other   Pertinent Positives:  Pertinent Negatives:    Additional History of Present Condition: Patient has has for some time. when it seems to go away patient states It crusts up     Assessment and Plan:  I have discussed with the patient that a sample of skin via a \"skin biopsy” would be potentially helpful to further make a specific diagnosis under the microscope.  Based on a thorough discussion of this condition and the management approach to it (including a comprehensive discussion of the known risks, side effects and potential benefits of treatment), the patient (family) agrees to implement the following specific plan:    Procedure:  Skin Biopsy.  After a thorough discussion of treatment options and risk/benefits/alternatives (including but not limited to local pain, scarring, dyspigmentation, blistering, possible superinfection, and inability to confirm a diagnosis via histopathology), verbal and written consent were obtained and portion of the rash was biopsied for tissue sample.  See below for consent that was obtained from patient and subsequent Procedure Note.     PROCEDURE TANGENTIAL (SHAVE) BIOPSY NOTE:    Performing Physician:    Anatomic Location; Clinical Description with size (cm); Pre-Op Diagnosis:   Specimen A: left upper arm; 0.5 cm x 0.7 cm crusted pink papule; Diff Dx: Dermatofibroma vs Other   Post-op diagnosis: Same     Local anesthesia: 1% xylocaine with epi      Topical anesthesia: None    Hemostasis: Aluminum chloride       After obtaining informed consent  at which time there was a discussion about the purpose of biopsy  and low risks of infection and bleeding.  The area was prepped and draped in the usual fashion. Anesthesia was obtained with 1% lidocaine with epinephrine. A shave biopsy to an appropriate sampling depth was obtained by Shave (Dermablade or 15 blade) The resulting wound was covered with surgical ointment and bandaged appropriately.     The patient " "tolerated the procedure well without complications and was without signs of functional compromise.      Specimen has been sent for review by Dermatopathology.    Standard post-procedure care has been explained and has been included in written form within the patient's copy of Informed Consent.    INFORMED CONSENT DISCUSSION AND POST-OPERATIVE INSTRUCTIONS FOR PATIENT    I.  RATIONALE FOR PROCEDURE  I understand that a skin biopsy allows the Dermatologist to test a lesion or rash under the microscope to obtain a diagnosis.  It usually involves numbing the area with numbing medication and removing a small piece of skin; sometimes the area will be closed with sutures. In this specific procedure, sutures are not usually needed.  If any sutures are placed, then they are usually need to be removed in 2 weeks or less.    I understand that my Dermatologist recommends that a skin \"shave\" biopsy be performed today.  A local anesthetic, similar to the kind that a dentist uses when filling a cavity, will be injected with a very small needle into the skin area to be sampled.  The injected skin and tissue underneath \"will go to sleep” and become numb so no pain should be felt afterwards.  An instrument shaped like a tiny \"razor blade\" (shave biopsy instrument) will be used to cut a small piece of tissue and skin from the area so that a sample of tissue can be taken and examined more closely under the microscope.  A slight amount of bleeding will occur, but it will be stopped with direct pressure and a pressure bandage and any other appropriate methods.  I understands that a scar will form where the wound was created.  Surgical ointment will be applied to help protect the wound.  Sutures are not usually needed.    II.  RISKS AND POTENTIAL COMPLICATIONS   I understand the risks and potential complications of a skin biopsy include but are not limited to the following:  Bleeding  Infection  Pain  Scar/keloid  Skin " "discoloration  Incomplete Removal  Recurrence  Nerve Damage/Numbness/Loss of Function  Allergic Reaction to Anesthesia  Biopsies are diagnostic procedures and based on findings additional treatment or evaluation may be required  Loss or destruction of specimen resulting in no additional findings    My Dermatologist has explained to me the nature of the condition, the nature of the procedure, and the benefits to be reasonably expected compared with alternative approaches.  My Dermatologist has discussed the likelihood of major risks or complications of this procedure including the specific risks listed above, such as bleeding, infection, and scarring/keloid.  I understand that a scar is expected after this procedure.  I understand that my physician cannot predict if the scar will form a \"keloid,\" which extends beyond the borders of the wound that is created.  A keloid is a thick, painful, and bumpy scar.  A keloid can be difficult to treat, as it does not always respond well to therapy, which includes injecting cortisone directly into the keloid every few weeks.  While this usually reduces the pain and size of the scar, it does not eliminate it.      I understand that photographs may be taken before and after the procedure.  These will be maintained as part of the medical providers confidential records and may not be made available to me.  I further authorize the medical provider to use the photographs for teaching purposes or to illustrate scientific papers, books, or lectures if in his/her judgment, medical research, education, or science may benefit from its use.    I have had an opportunity to fully inquire about the risks and benefits of this procedure and its alternatives.   I have been given ample time and opportunity to ask questions and to seek a second opinion if I wished to do so.  I acknowledge that there have specifically been no guarantees as to the cosmetic results from the procedure.  I am aware that " "with any procedure there is always the possibility of an unexpected complication.    III. POST-PROCEDURAL CARE (WHAT YOU WILL NEED TO DO \"AFTER THE BIOPSY\" TO OPTIMIZE HEALING)    Keep the area clean and dry.  Try NOT to remove the bandage or get it wet for the first 24 hours.    Gently clean the area and apply surgical ointment (such as Vaseline petrolatum ointment, which is available \"over the counter\" and not a prescription) to the biopsy site for up to 2 weeks straight.  This acts to protect the wound from the outside world.      Sutures are not usually placed in this procedure.  If any sutures were placed, return for suture removal as instructed (generally 1 week for the face, 2 weeks for the body).      Take Acetaminophen (Tylenol) for discomfort, if no contraindications.  Ibuprofen or aspirin could make bleeding worse.    Call our office immediately for signs of infection: fever, chills, increased redness, warmth, tenderness, discomfort/pain, or pus or foul smell coming from the wound.    WHAT TO DO IF THERE IS ANY BLEEDING?  If a small amount of bleeding is noticed, place a clean cloth over the area and apply firm pressure for ten minutes.  Check the wound after 10 minutes of direct pressure.  If bleeding persists, try one more time for an additional 10 minutes of direct pressure on the area.  If the bleeding becomes heavier or does not stop after the second attempt, or if you have any other questions about this procedure, then please call your [facility] Dermatologist by calling [phone] (SKIN).     I hereby acknowledge that I have reviewed and verified the site with my Dermatologist and have requested and authorized my Dermatologist to proceed with the procedure.           Scribe Attestation      I,:  Edyta Evans am acting as a scribe while in the presence of the attending physician.:       I,:  Gela Navarro MD personally performed the services described in this documentation    as scribed in my " presence.:

## 2024-07-08 PROCEDURE — 88305 TISSUE EXAM BY PATHOLOGIST: CPT | Performed by: STUDENT IN AN ORGANIZED HEALTH CARE EDUCATION/TRAINING PROGRAM

## 2024-07-09 NOTE — RESULT ENCOUNTER NOTE
DERMATOPATHOLOGY RESULT NOTE    Results reviewed by ordering physician.  Reviewed. Sent Zik[employer] message.      Instructions for Clinical Derm Team:   (remember to route Result Note to appropriate staff):    None    Result & Plan by Specimen:    Specimen A: malignant  Plan: tbd

## 2024-08-22 ENCOUNTER — ANNUAL EXAM (OUTPATIENT)
Dept: OBGYN CLINIC | Facility: CLINIC | Age: 34
End: 2024-08-22
Payer: COMMERCIAL

## 2024-08-22 VITALS
HEIGHT: 68 IN | WEIGHT: 167 LBS | DIASTOLIC BLOOD PRESSURE: 62 MMHG | BODY MASS INDEX: 25.31 KG/M2 | SYSTOLIC BLOOD PRESSURE: 104 MMHG

## 2024-08-22 DIAGNOSIS — R53.83 FATIGUE, UNSPECIFIED TYPE: ICD-10-CM

## 2024-08-22 DIAGNOSIS — E28.2 POLYCYSTIC OVARIAN SYNDROME: ICD-10-CM

## 2024-08-22 DIAGNOSIS — Z01.419 WELL WOMAN EXAM WITH ROUTINE GYNECOLOGICAL EXAM: Primary | ICD-10-CM

## 2024-08-22 DIAGNOSIS — N90.89 VULVAR IRRITATION: ICD-10-CM

## 2024-08-22 PROCEDURE — 99395 PREV VISIT EST AGE 18-39: CPT | Performed by: OBSTETRICS & GYNECOLOGY

## 2024-08-22 RX ORDER — DIPHENOXYLATE HYDROCHLORIDE AND ATROPINE SULFATE 2.5; .025 MG/1; MG/1
1 TABLET ORAL DAILY
COMMUNITY

## 2024-08-22 RX ORDER — FLUCONAZOLE 150 MG/1
150 TABLET ORAL ONCE
Qty: 1 TABLET | Refills: 0 | Status: SHIPPED | OUTPATIENT
Start: 2024-08-22 | End: 2024-08-22

## 2024-08-22 RX ORDER — TRIAMCINOLONE ACETONIDE 1 MG/G
OINTMENT TOPICAL 2 TIMES DAILY
Qty: 80 G | Refills: 0 | Status: SHIPPED | OUTPATIENT
Start: 2024-08-22

## 2024-08-22 NOTE — PROGRESS NOTES
ASSESSMENT & PLAN:   Diagnoses and all orders for this visit:    Well woman exam with routine gynecological exam    Fatigue, unspecified type  -     TSH, 3rd generation with Free T4 reflex; Future    Polycystic ovarian syndrome  -     Testosterone, free, total; Future  -     Hemoglobin A1C; Future    Vulvar irritation  -     fluconazole (DIFLUCAN) 150 mg tablet; Take 1 tablet (150 mg total) by mouth once for 1 dose  -     triamcinolone (KENALOG) 0.1 % ointment; Apply topically 2 (two) times a day    Other orders  -     multivitamin (THERAGRAN) TABS; Take 1 tablet by mouth daily          The following were reviewed in today's visit: ASCCP guidelines, Gardisil vaccination, STD testing breast self exam, family planning choices, exercise, and healthy diet.    Patient to return to office in yearly for annual exam.     All questions have been answered to her satisfaction.        CC:  Annual Gynecologic Examination  Chief Complaint   Patient presents with    Gynecologic Exam     Annual exam, pap not indicated. Pt c/o vaginal discomfort and itching.    2023 Baby girl Allyssa with    Last pap -neg pap/neg hpv (Scanned into chart)          HPI: Tabitha Jaimes is a 34 y.o.  who presents for annual gynecologic examination.  She has the following concerns:  vaginal itching and discomfort going on for the past month. No increase in intensity and denies vaginal discharge. She states that the itching comes and goes. Will improve with application of ointment and washing.     Reports increased fatigue occurring as well. Interested in getting her hormone levels tested related to her thyroid, and PCOS related hormones.       Health Maintenance:    Exercise: frequently  Breast exams/breast awareness: yes    Past Medical History:   Diagnosis Date    Anxiety     PCOS (polycystic ovarian syndrome)     dx in early      (spontaneous vaginal delivery) 2023    Transfer of care from [facility], has records, they  are in [place]  -Had [facility]IPS drawn [facility] in [facility]       Varicella     had chicken pox       Past Surgical History:   Procedure Laterality Date    NO PAST SURGERIES         Past OB/Gyn History:  Period Duration (Days): 5-7  Period Pattern: (!) Irregular  Menstrual Flow: Moderate, Light  Menstrual Control: Tampon, Maxi pad, Thin pad, Panty liner  Dysmenorrhea: (!) Mild  Dysmenorrhea Symptoms: CrampingPatient's last menstrual period was 2024 (exact date).    Last Pap:  : no abnormalities  History of abnormal Pap smear: no  HPV vaccine completed: no    Patient is currently sexually active.   STD testing: no  Current contraception: abstinence, coitus interruptus, and rhythm method      Family History  Family History   Problem Relation Age of Onset    Hypertension Mother     Other Father         spinal issues    Rashes / Skin problems Father     Arthritis Sister     Osteoarthritis Sister     No Known Problems Brother     Cancer Maternal Grandmother     Stroke Maternal Grandfather     Colon cancer Maternal Grandfather     Lung disease Paternal Grandmother     Heart attack Paternal Grandfather     No Known Problems Half-Brother        Family history of uterine or ovarian cancer: no  Family history of breast cancer: no  Family history of colon cancer: no    Social History:  Social History     Socioeconomic History    Marital status: /Civil Union     Spouse name: Not on file    Number of children: Not on file    Years of education: Not on file    Highest education level: Not on file   Occupational History    Not on file   Tobacco Use    Smoking status: Former     Current packs/day: 0.00     Average packs/day: 0.3 packs/day for 13.5 years (3.4 ttl pk-yrs)     Types: Cigarettes     Start date: 2009     Quit date: 2023     Years since quittin.6    Smokeless tobacco: Never    Tobacco comments:     Minimal the past year less than 5 a week, rolled tobacco past 5 years   Vaping Use    Vaping status:  Never Used   Substance and Sexual Activity    Alcohol use: Yes     Alcohol/week: 1.0 standard drink of alcohol     Types: 1 Standard drinks or equivalent per week     Comment: Occasionally    Drug use: Not Currently     Frequency: 0.3 times per week     Types: Benzodiazepines     Comment: .25 mg only occasionally for panic attacks    Sexual activity: Yes     Partners: Male     Birth control/protection: Coitus interruptus   Other Topics Concern    Not on file   Social History Narrative    Not on file     Social Determinants of Health     Financial Resource Strain: Not on file   Food Insecurity: Not on file   Transportation Needs: Not on file   Physical Activity: Not on file   Stress: Not on file   Social Connections: Not on file   Intimate Partner Violence: Not on file   Housing Stability: Not on file     Domestic violence screen: negative    Allergies:  No Known Allergies    Medications:    Current Outpatient Medications:     fluconazole (DIFLUCAN) 150 mg tablet, Take 1 tablet (150 mg total) by mouth once for 1 dose, Disp: 1 tablet, Rfl: 0    fluticasone (FLONASE) 50 mcg/act nasal spray, 1 spray into each nostril if needed for allergies Taking as needed, Disp: , Rfl:     MAGNESIUM GLYCINATE PO, Take by mouth, Disp: , Rfl:     multivitamin (THERAGRAN) TABS, Take 1 tablet by mouth daily, Disp: , Rfl:     triamcinolone (KENALOG) 0.1 % ointment, Apply topically 2 (two) times a day, Disp: 80 g, Rfl: 0    cetirizine (ZyrTEC) 10 mg tablet, Take 10 mg by mouth daily (Patient not taking: Reported on 1/10/2024), Disp: , Rfl:     meloxicam (Mobic) 7.5 mg tablet, Take 1 tablet (7.5 mg total) by mouth daily (Patient not taking: Reported on 7/3/2024), Disp: 30 tablet, Rfl: 0    Prenatal Vit-Fe Fumarate-FA (PRENATAL VITAMIN PO), Take by mouth, Disp: , Rfl:     Review of Systems:  Review of Systems   Constitutional:  Negative for activity change, appetite change and unexpected weight change.   Respiratory:  Negative for cough and  "shortness of breath.    Cardiovascular:  Negative for chest pain.   Gastrointestinal:  Negative for abdominal pain, constipation, diarrhea, nausea and vomiting.   Genitourinary:  Positive for vaginal pain (vulvar irritation and itching). Negative for difficulty urinating, dyspareunia, frequency, menstrual problem, pelvic pain, urgency, vaginal bleeding and vaginal discharge.   Musculoskeletal:  Negative for back pain.   Skin: Negative.    Neurological:  Negative for dizziness, weakness, light-headedness and headaches.   Psychiatric/Behavioral: Negative.           Physical Exam:  /62 (BP Location: Left arm, Patient Position: Sitting, Cuff Size: Standard)   Ht 5' 8\" (1.727 m)   Wt 75.8 kg (167 lb)   LMP 07/27/2024 (Exact Date)   Breastfeeding No   BMI 25.39 kg/m²    Physical Exam  Constitutional:       General: She is not in acute distress.     Appearance: Normal appearance. She is well-developed. She is not diaphoretic.   Genitourinary:      Vulva and bladder normal.      No lesions in the vagina.      Genitourinary Comments: Erythema on bilateral labia minora and major encompassing vaginal introitus and vulva.       Right Labia: tenderness and skin changes.      Right Labia: No rash or lesions.     Left Labia: tenderness and skin changes.      Left Labia: No lesions or rash.     No inguinal adenopathy present in the right or left side.     No vaginal discharge, erythema, tenderness or bleeding.      No vaginal prolapse present.     No vaginal atrophy present.     Vaginal exam comments: Wet mount negative for yeast with hyphae and clue cells. Negative whiff..        Right Adnexa: not tender, not full and no mass present.     Left Adnexa: not tender, not full and no mass present.     No cervical motion tenderness, discharge, friability, lesion or polyp.      No parametrium nodularity or thickening present.     Uterus is not enlarged or tender.      No uterine mass detected.     No urethral prolapse or mass " present.      Bladder is not tender.       Pelvic exam was performed with patient in the lithotomy position.   Rectum:      No tenderness or external hemorrhoid.   Breasts:     Breasts are symmetrical.      Right: No swelling, bleeding, mass, skin change or tenderness.      Left: No swelling, bleeding, mass, skin change or tenderness.   HENT:      Head: Normocephalic and atraumatic.   Neck:      Thyroid: No thyromegaly or thyroid tenderness.   Cardiovascular:      Rate and Rhythm: Normal rate and regular rhythm.      Heart sounds: Normal heart sounds. No murmur heard.     No friction rub.   Pulmonary:      Effort: Pulmonary effort is normal. No respiratory distress.      Breath sounds: Normal breath sounds. No wheezing or rales.   Abdominal:      Palpations: Abdomen is soft. There is no mass.      Tenderness: There is no abdominal tenderness. There is no guarding.   Musculoskeletal:         General: No tenderness. Normal range of motion.      Right lower leg: No edema.      Left lower leg: No edema.   Lymphadenopathy:      Lower Body: No right inguinal adenopathy. No left inguinal adenopathy.   Neurological:      Mental Status: She is alert and oriented to person, place, and time.   Skin:     General: Skin is warm and dry.      Coloration: Skin is not pale.      Findings: No erythema.   Psychiatric:         Mood and Affect: Mood normal.         Behavior: Behavior normal.         Thought Content: Thought content normal.         Judgment: Judgment normal.   Vitals and nursing note reviewed.

## 2024-08-27 ENCOUNTER — APPOINTMENT (OUTPATIENT)
Dept: LAB | Facility: HOSPITAL | Age: 34
End: 2024-08-27
Payer: COMMERCIAL

## 2024-08-27 DIAGNOSIS — Z13.220 ENCOUNTER FOR LIPID SCREENING FOR CARDIOVASCULAR DISEASE: ICD-10-CM

## 2024-08-27 DIAGNOSIS — Z00.00 ANNUAL PHYSICAL EXAM: ICD-10-CM

## 2024-08-27 DIAGNOSIS — E28.2 POLYCYSTIC OVARIAN SYNDROME: ICD-10-CM

## 2024-08-27 DIAGNOSIS — R53.83 FATIGUE, UNSPECIFIED TYPE: ICD-10-CM

## 2024-08-27 DIAGNOSIS — Z13.6 ENCOUNTER FOR LIPID SCREENING FOR CARDIOVASCULAR DISEASE: ICD-10-CM

## 2024-08-27 LAB
ALBUMIN SERPL BCG-MCNC: 4.2 G/DL (ref 3.5–5)
ALP SERPL-CCNC: 48 U/L (ref 34–104)
ALT SERPL W P-5'-P-CCNC: 12 U/L (ref 7–52)
ANION GAP SERPL CALCULATED.3IONS-SCNC: 5 MMOL/L (ref 4–13)
AST SERPL W P-5'-P-CCNC: 16 U/L (ref 13–39)
BASOPHILS # BLD AUTO: 0.03 THOUSANDS/ÂΜL (ref 0–0.1)
BASOPHILS NFR BLD AUTO: 1 % (ref 0–1)
BILIRUB SERPL-MCNC: 1.47 MG/DL (ref 0.2–1)
BUN SERPL-MCNC: 12 MG/DL (ref 5–25)
CALCIUM SERPL-MCNC: 9.2 MG/DL (ref 8.4–10.2)
CHLORIDE SERPL-SCNC: 104 MMOL/L (ref 96–108)
CHOLEST SERPL-MCNC: 176 MG/DL
CO2 SERPL-SCNC: 30 MMOL/L (ref 21–32)
CREAT SERPL-MCNC: 0.66 MG/DL (ref 0.6–1.3)
EOSINOPHIL # BLD AUTO: 0.13 THOUSAND/ÂΜL (ref 0–0.61)
EOSINOPHIL NFR BLD AUTO: 2 % (ref 0–6)
ERYTHROCYTE [DISTWIDTH] IN BLOOD BY AUTOMATED COUNT: 13.3 % (ref 11.6–15.1)
EST. AVERAGE GLUCOSE BLD GHB EST-MCNC: 100 MG/DL
GFR SERPL CREATININE-BSD FRML MDRD: 115 ML/MIN/1.73SQ M
GLUCOSE P FAST SERPL-MCNC: 82 MG/DL (ref 65–99)
HBA1C MFR BLD: 5.1 %
HCT VFR BLD AUTO: 41.7 % (ref 34.8–46.1)
HDLC SERPL-MCNC: 67 MG/DL
HGB BLD-MCNC: 13.6 G/DL (ref 11.5–15.4)
IMM GRANULOCYTES # BLD AUTO: 0.01 THOUSAND/UL (ref 0–0.2)
IMM GRANULOCYTES NFR BLD AUTO: 0 % (ref 0–2)
LDLC SERPL CALC-MCNC: 99 MG/DL (ref 0–100)
LYMPHOCYTES # BLD AUTO: 2.01 THOUSANDS/ÂΜL (ref 0.6–4.47)
LYMPHOCYTES NFR BLD AUTO: 38 % (ref 14–44)
MCH RBC QN AUTO: 29.4 PG (ref 26.8–34.3)
MCHC RBC AUTO-ENTMCNC: 32.6 G/DL (ref 31.4–37.4)
MCV RBC AUTO: 90 FL (ref 82–98)
MONOCYTES # BLD AUTO: 0.41 THOUSAND/ÂΜL (ref 0.17–1.22)
MONOCYTES NFR BLD AUTO: 8 % (ref 4–12)
NEUTROPHILS # BLD AUTO: 2.77 THOUSANDS/ÂΜL (ref 1.85–7.62)
NEUTS SEG NFR BLD AUTO: 51 % (ref 43–75)
NONHDLC SERPL-MCNC: 109 MG/DL
NRBC BLD AUTO-RTO: 0 /100 WBCS
PLATELET # BLD AUTO: 202 THOUSANDS/UL (ref 149–390)
PMV BLD AUTO: 9.4 FL (ref 8.9–12.7)
POTASSIUM SERPL-SCNC: 4.3 MMOL/L (ref 3.5–5.3)
PROT SERPL-MCNC: 6.7 G/DL (ref 6.4–8.4)
RBC # BLD AUTO: 4.63 MILLION/UL (ref 3.81–5.12)
SODIUM SERPL-SCNC: 139 MMOL/L (ref 135–147)
TRIGL SERPL-MCNC: 50 MG/DL
TSH SERPL DL<=0.05 MIU/L-ACNC: 2.21 UIU/ML (ref 0.45–4.5)
WBC # BLD AUTO: 5.36 THOUSAND/UL (ref 4.31–10.16)

## 2024-08-27 PROCEDURE — 85025 COMPLETE CBC W/AUTO DIFF WBC: CPT

## 2024-08-27 PROCEDURE — 80053 COMPREHEN METABOLIC PANEL: CPT

## 2024-08-27 PROCEDURE — 36415 COLL VENOUS BLD VENIPUNCTURE: CPT

## 2024-08-27 PROCEDURE — 84403 ASSAY OF TOTAL TESTOSTERONE: CPT

## 2024-08-27 PROCEDURE — 80061 LIPID PANEL: CPT

## 2024-08-27 PROCEDURE — 83036 HEMOGLOBIN GLYCOSYLATED A1C: CPT

## 2024-08-27 PROCEDURE — 84402 ASSAY OF FREE TESTOSTERONE: CPT

## 2024-08-27 PROCEDURE — 84443 ASSAY THYROID STIM HORMONE: CPT

## 2024-08-28 LAB
TESTOST FREE SERPL-MCNC: 1.6 PG/ML (ref 0–4.2)
TESTOST SERPL-MCNC: 40 NG/DL (ref 8–60)

## 2024-09-03 ENCOUNTER — NURSE TRIAGE (OUTPATIENT)
Age: 34
End: 2024-09-03

## 2024-09-03 NOTE — TELEPHONE ENCOUNTER
Pt calling in, notes she has arm BCC excision  10/9 and traveling via plane that weekend, wanting to ensure was ok as nonrefundable ticket. Advised pt to be careful with arm and mindful of healing, advised there would be healthcall/on call for any pressing concerns over weekend. No other questions at this time.

## 2024-09-04 DIAGNOSIS — R17 ELEVATED BILIRUBIN: Primary | ICD-10-CM

## 2024-10-09 ENCOUNTER — PROCEDURE VISIT (OUTPATIENT)
Dept: DERMATOLOGY | Facility: CLINIC | Age: 34
End: 2024-10-09
Payer: COMMERCIAL

## 2024-10-09 VITALS
SYSTOLIC BLOOD PRESSURE: 120 MMHG | DIASTOLIC BLOOD PRESSURE: 79 MMHG | TEMPERATURE: 98.8 F | HEIGHT: 68 IN | BODY MASS INDEX: 24.71 KG/M2 | WEIGHT: 163 LBS

## 2024-10-09 DIAGNOSIS — D48.9 NEOPLASM OF UNCERTAIN BEHAVIOR: Primary | ICD-10-CM

## 2024-10-09 PROCEDURE — 11602 EXC TR-EXT MAL+MARG 1.1-2 CM: CPT | Performed by: DERMATOLOGY

## 2024-10-09 PROCEDURE — 12032 INTMD RPR S/A/T/EXT 2.6-7.5: CPT | Performed by: DERMATOLOGY

## 2024-10-09 PROCEDURE — 88305 TISSUE EXAM BY PATHOLOGIST: CPT | Performed by: STUDENT IN AN ORGANIZED HEALTH CARE EDUCATION/TRAINING PROGRAM

## 2024-10-09 NOTE — PROGRESS NOTES
"[facility] Dermatology Clinic Note     Patient Name: Tabitha Jaimes  Encounter Date: 10/9/2024     Have you been cared for by a [facility] Dermatologist in the last 3 years and, if so, which description applies to you?    Yes.  I have been here within the last 3 years, and my medical history has NOT changed since that time.  I am FEMALE/of child-bearing potential.    REVIEW OF SYSTEMS:  Have you recently had or currently have any of the following? No changes in my recent health.   PAST MEDICAL HISTORY:  Have you personally ever had or currently have any of the following?  If \"YES,\" then please provide more detail. No changes in my medical history.   HISTORY OF IMMUNOSUPPRESSION: Do you have a history of any of the following:  Systemic Immunosuppression such as Diabetes, Biologic or Immunotherapy, Chemotherapy, Organ Transplantation, Bone Marrow Transplantation or Prednisone?  No     Answering \"YES\" requires the addition of the dotphrase \"IMMUNOSUPPRESSED\" as the first diagnosis of the patient's visit.   FAMILY HISTORY:  Any \"first degree relatives\" (parent, brother, sister, or child) with the following?    No changes in my family's known health.   PATIENT EXPERIENCE:    Do you want the Dermatologist to perform a COMPLETE skin exam today including a clinical examination under the \"bra and underwear\" areas?  NO  If necessary, do we have your permission to call and leave a detailed message on your Preferred Phone number that includes your specific medical information?  NO      No Known Allergies   Current Outpatient Medications:     fluticasone (FLONASE) 50 mcg/act nasal spray, 1 spray into each nostril if needed for allergies Taking as needed, Disp: , Rfl:     MAGNESIUM GLYCINATE PO, Take by mouth, Disp: , Rfl:     multivitamin (THERAGRAN) TABS, Take 1 tablet by mouth daily, Disp: , Rfl:     triamcinolone (KENALOG) 0.1 % ointment, Apply topically 2 (two) times a day, Disp: 80 g, Rfl: 0    cetirizine (ZyrTEC) 10 mg " "tablet, Take 10 mg by mouth daily (Patient not taking: Reported on 1/10/2024), Disp: , Rfl:     meloxicam (Mobic) 7.5 mg tablet, Take 1 tablet (7.5 mg total) by mouth daily (Patient not taking: Reported on 7/3/2024), Disp: 30 tablet, Rfl: 0    Prenatal Vit-Fe Fumarate-FA (PRENATAL VITAMIN PO), Take by mouth, Disp: , Rfl:           Whom besides the patient is providing clinical information about today's encounter?   NO ADDITIONAL HISTORIAN (patient alone provided history)    Physical Exam and Assessment/Plan by Diagnosis:    Patient is present for an excision on a BCC on the left upper arm.       PROCEDURE:  EXCISION NOTE     Procedural Plan:     Attending:  & Dr. Henson   Assistant:  Naa Rodriguez  Lesion Anatomic Location (use description from previous biopsy if applicable): left upper arm   Pre-Op Diagnosis: BASAL CELL CARCINOMA (SUPERFICIAL TYPE)  Lesion is being treated as \"benign\" or \"MALIGNANT\": MALIGNANT; final PATHOLOGICAL STAGING (use \"N/A\" if not reported in Path Report) :  n/a  Accession Number of any associated previous biopsy/excision: [id number]       Written and verbal (witnessed) informed consent was obtained. We discussed that \"excision\" is a method of removing lesions, both benign and malignant lesions.  A portion of normal skin is often taken to ensure completeness of removal.  I reviewed that this procedure will include numbing the area, cutting around and under the skin lesion, undermining (\"freeing up\") surrounding tissue, and closing the wound with sutures (stitches) both inside and out.  Risks include and are not limited to the following:  Bleeding, pain, infection, scarring, recurrence, more required treatment, no additional information, numbness and/or loss of function (if nerves are damaged).  These risks were considered against the benefits that we discussed, and the patient opted to continue with the procedure. It was discussed with patient that every effort is " "made to minimize scarring, but scarring is influenced also by extrinsic factor such as location, age and genetics.     Procedural Time Out:      Correct patient? yes  Correct site per Clinic Report? yes  Correct site per previous Path Report? yes  Correct site per Patient's recollection? yes    Anesthesia:      Local anesthesia: 3:1 1% xylocaine with epi and 1-100,000 buffered    Excision Description:      Post-Op Diagnosis: Same as Pre-Op Diagnosis (above)  Pre-op Size: 0.6 cm x 0.7 cm pink scar at site of previous biopsy  Margins (enter \"0\" for lipoma/cyst or similar diagnosis): 4 mm  TOTAL POSTOPERATIVE DEFECT SIZE (I.e., Pre-op Size + Margins on both sides):  1.4 x 1.5 cm    The patient was seated in the procedure/exam room, anesthetized locally, prepped and draped in the usual fashion. Using a #15 blade with a scalpel, the lesion was excised in elliptical fashion.     REQUIRED Anita MELANOMA DATA      This procedure was not performed to treat primary cutaneous melanoma through wide local excision      Closure Description:      The specific type of closure that was utilized:     INTERMEDIATE Closure  INTERMEDIATE CLOSURE     The patient was brought back into the procedure room, anesthetized locally, prepped and draped in the usual fashion. Using a #15 blade with a scalpel, the lesion was excised in elliptical fashion. The wound was  undermined in the fascial plane.  Purpose of undermining was to decrease wound tension and facilitate closure. Hemostasis was achieved with light electrocoagulation.    The wound was closed with subcutaneous sutures as follows:    Deep Suture Throw, Size, and Type (select all that apply):  Interrupted Deeps; 4-0; vicryl     Epidermal edge closure was accomplished with superficial sutures as follows:    Superficial Suture Throw, Size and Type (select all that apply):  Simple Interrupted; 4-0; Prolene    FINAL LENGTH OF CLOSURE (please enter a length even for lipoma/cyst or similar " "diagnosis): 4.6 cm       Postoperative Care:      The wound was cleaned with sterile saline, dried off, surgical vaseline ointment was applied, and the wound was covered. A pressure dressing was applied for stabilization and light pressure.    Estimated blood loss:  Less than 3ml.  Complications: none  Post-op medications: none  Additional notes: none    Discharge Plans:      Discharge plans: Plan for return to us for suture removal, as scheduled in 14 days.   Patient condition at discharge: STABLE    The patient was given detailed oral and written instructions on postoperative care as detailed in consent. We urged the patient to call us if any problems or question should arise.         Please complete this section and then \"cut and paste\" it into the Patient Instructions section.  These notes should be printed and shared directly with the patient for review PRIOR TO leaving our office.         YOUR \"AFTER SURGERY\" REVIEW & INSTRUCTIONS    What to Know About Your Procedure  An \"excision\" was performed today to allow the dermatologist to remove a skin lesion. The procedure involves a local numbing medication and removing the entire lesion (or as much as possible). Typically, the lesion is being removed because it does not look \"normal,\" because it is becoming irritated and traumatized, or for significant appearance reasons.  The skin was cut deeply and then repaired - usually with sutures (stitches).  The removed tissue has been sent to the pathologist who will confirm the diagnosis and verify if the lesion has been completely removed.  Surgical “Vaseline-type” ointment has been applied after the procedure to help create a barrier between your wound and the outside world.     The advantage of using sutures (stitches) to repair a skin excision is that it allows the lesion to heal as quickly as possible with the least amount of scarring and risk of infection, Still, there are some risks and potential complications that " "you should watch out for that include but are not limited to the following:    Some bleeding is normal at the time of procedure and some bleeding on the gauze bandage after the procedure is normal for the first few days after surgery.  Profuse bleeding or bleeding with swelling and pain is NOT normal and should be reported as detailed below.  Infection is uncommon after skin surgery.  Infection should be reported and is indicated by pain, redness, and discharge of purulent material.  Some pain may occur initially the day after surgery.  Persistent pain or increasing pain days after surgery is not expected and should be reported.  Every effort is made to minimize scar, but location, size, and genetics do play a role in scar appearance.  A surgical wound does not achieve its optimal appearance until 6 months.  There are several treatments available if scarring would be problematic including scar creams, silicone pad, laser and scar revision.  Skin discoloration can occur especially in people of color.  Its important to avoid sun on wound in first 6 months after surgery.  Treatment is available if pigment is problematic.  Incomplete removal of the lesion or recurrence of lesion can occur and - depending on the lesion - this would then require further treatment and more surgery.  Nerve damage/numbness and/or loss of function is very rare, but is most likely to occur if the lesion being removed is large or if it is in a \"high risk\" location.  Allergic reaction to lidocaine is rare.  More commonly, epinephrine is used with the lidocaine, and, occasionally, epinephrine (a.k.a., adrenalin) may cause a brief feeling of anxiety or jitteriness.  The person at the microscope (pathologist) may provide additional information that was unexpected. This unexpected finding could prompt the need for additional treatment or evaluation.    At-Home Wound Care  Try NOT to remove the pressure bandage for 48 hours. Keep the area clean and " "dry while this bandage is on.   After removing the bandage for the first time, gently clean the area with soap and water. If the bandage is difficult to remove, getting the bandage wet in the shower will sometimes help soften the adhesive and allow it to be removed more easily.   You will now need to cleanse this area daily in the shower with gentle soap. There is no need to scrub the area. You will need to apply plain Vaseline ointment (this is over the counter and not a prescription) to the site for up to 2 weeks followed by a clean appropriately sized bandage to area.  Non stick dressing and paper tape (or Hypafix) are recommended for sensitive skin but a bandaid is fine if it covers the area well.  In general, sutures (stitches) are removed in about 5-7 days for face wounds and in about 12-14 days for the body wounds.  Your dermatologist wants you to return for suture removal in 14 DAYS.     General Restrictions  For TWO (2) DAYS:  You will need to take it very easy as this time is highest risk for bleeding. Being a \"couch potato\" during these two days is generally recommended.   For surgeries on the face/neck/scalp: Avoid leaning down to pick things up off the floor as this brings blood up to your head. Instead, squat down to pick things up.     For TWO WEEKS (14 DAYS):   No heavy lifting (anything greater than 10 pounds)   You can start to do slow, gentle activities such as slow walking but nothing to increase your heart rate and blood pressure too much (such as cardiovascular exercise).  It is important to take it easy as there is still a risk for bleeding and a high risk popping of stitches open during this time.     Site Specific Restrictions  If we did surgery near your eyes (including the nose, forehead, front part of your scalp, cheeks): It is VERY common to get a large amount of swelling around your eyes (puffy eyes). Although less frequent, this can be enough to swell your eyes shut and can also come " "along with bruising. This should not hurt and is very expected and normal. It is typically worst at ~ 3 days out from your surgery and dramatically better 1 week post-operatively.   If we did surgery around your nose: No blowing your nose as this puts you at higher risk of popping stitches durign this time. Instead dab under your nose with a tissue or use a Q-tip inside your nose.  If we did surgery on the skin above or below your lip or your lip itself: No sipping from straws as this uses a lot of the muscles around your mouth and increases the risk of popping stitches during this time.    Managing Your Pain After Surgery  You can expect to have some pain after surgery. This is normal. The pain is typically worse the first two days after surgery, and quickly begins to get better.   You can use heating pads or ice packs on your incisions to help reduce your pain.   The best strategy for controlling your pain after surgery is \"around the clock\" pain control. You can take \"over-the-counter\" (non-prescription) Acetaminophen (Tylenol) for discomfort, unless you have been told not to by your physician.  If you are taking Tylenol at the maximum dose, you can alternate Tylenol with Advil/Motrin (ibuprofen) as long as there are no contraindications.  Alternating these medications with each other (I.e., Tylenol followed by Motrin/Advil) allows you to maximize your pain control.  To alternate these medications properly, you will take a dose of pain medication every three hours, alternating Tylenol (acetaminophen) and Advil/Motrin (ibuprofen).  Start by taking 650 mg of Tylenol (2 pills of 325 mg)  3 hours later take 600 mg of Motrin (3 pills of 200 mg)  3 hours after taking the Motrin take 650 mg of Tylenol  3 hours after that take 600 mg of Motrin.    As an example, if your first dose of Tylenol (acetaminophen) is at 12:00 PM, then you would alternate with Motrin as directed below, continuing usually for no more than a total " "of 48 hours straight:     12:00 PM  Tylenol 650 mg (2 pills of 325 mg)    3:00 PM  Motrin 600 mg (3 pills of 200 mg)    6:00 PM  Tylenol 650 mg (2 pills of 325 mg)    9:00 PM  Motrin 600 mg (3 pills of 200 mg)      WARNING:  Do NOT take more than 4000 mg of Tylenol or 3200 mg of Motrin in a \"24-hour\" period.       What if you still have pain?    If you have pain that is not controlled with the over-the-counter pain medications (Tylenol and Motrin/Advil), do not hesitate to call our staff using the number provided. We will help make sure you are managing your pain in the best way possible, and if necessary, we can provide a prescription for additional pain medication.     Call our office IMMEDIATELY with any signs of wound infection.  This includes fever, chills, increasing redness, warmth, tenderness, severe discomfort/pain, or pus or foul smell coming from the wound. . [facility] can be directly at [phone] (SKIN) and ask for the on-call Dermatologist covering surgery/Mohs.    If Bleeding is Noticed  If bleeding is soaking through the bandage, remove the bandage and see where the bleeding is coming from.  Place a clean cloth over the area and apply firm pressure directly to the area that is bleeding for thirty minutes.    Check the wound ONLY after 30 minutes of direct pressure; do not cheat and sneak a peak, as that does not count (i.e., resets the clock back to zero).  If bleeding persists after 30 minutes of legitimate direct pressure, then try one more round of direct pressure to the area.    Should bleeding become heavier or not stop after the second application of direct pressure for 30 minutes, then call . [facility] directly at [phone] (SKIN) and ask to speak with the on-call Dermatologist covering surgery/Mohs.  If after hours, go to your nearest Emergency Room or Urgent Care and have the team call . [facility] directly at [phone] (SKIN); you will be " connected to our after hours team.           BCC excised with 0.4cm margins for 1.5cm excision and 4.6cm closure.  Well tolerated.  S/R in 2 weeks.  Scribe Attestation      I,:  Naa Silveira MA am acting as a scribe while in the presence of the attending physician.:       I,:  Jose Montanez MD personally performed the services described in this documentation    as scribed in my presence.:

## 2024-10-09 NOTE — PATIENT INSTRUCTIONS
"PROCEDURE:  EXCISION NOTE    OUR \"AFTER SURGERY\" REVIEW & INSTRUCTIONS    What to Know About Your Procedure  An \"excision\" was performed today to allow the dermatologist to remove a skin lesion. The procedure involves a local numbing medication and removing the entire lesion (or as much as possible). Typically, the lesion is being removed because it does not look \"normal,\" because it is becoming irritated and traumatized, or for significant appearance reasons.  The skin was cut deeply and then repaired - usually with sutures (stitches).  The removed tissue has been sent to the pathologist who will confirm the diagnosis and verify if the lesion has been completely removed.  Surgical “Vaseline-type” ointment has been applied after the procedure to help create a barrier between your wound and the outside world.     The advantage of using sutures (stitches) to repair a skin excision is that it allows the lesion to heal as quickly as possible with the least amount of scarring and risk of infection, Still, there are some risks and potential complications that you should watch out for that include but are not limited to the following:    Some bleeding is normal at the time of procedure and some bleeding on the gauze bandage after the procedure is normal for the first few days after surgery.  Profuse bleeding or bleeding with swelling and pain is NOT normal and should be reported as detailed below.  Infection is uncommon after skin surgery.  Infection should be reported and is indicated by pain, redness, and discharge of purulent material.  Some pain may occur initially the day after surgery.  Persistent pain or increasing pain days after surgery is not expected and should be reported.  Every effort is made to minimize scar, but location, size, and genetics do play a role in scar appearance.  A surgical wound does not achieve its optimal appearance until 6 months.  There are several treatments available if scarring would be " "problematic including scar creams, silicone pad, laser and scar revision.  Skin discoloration can occur especially in people of color.  Its important to avoid sun on wound in first 6 months after surgery.  Treatment is available if pigment is problematic.  Incomplete removal of the lesion or recurrence of lesion can occur and - depending on the lesion - this would then require further treatment and more surgery.  Nerve damage/numbness and/or loss of function is very rare, but is most likely to occur if the lesion being removed is large or if it is in a \"high risk\" location.  Allergic reaction to lidocaine is rare.  More commonly, epinephrine is used with the lidocaine, and, occasionally, epinephrine (a.k.a., adrenalin) may cause a brief feeling of anxiety or jitteriness.  The person at the microscope (pathologist) may provide additional information that was unexpected. This unexpected finding could prompt the need for additional treatment or evaluation.    At-Home Wound Care  Try NOT to remove the pressure bandage for 48 hours. Keep the area clean and dry while this bandage is on.   After removing the bandage for the first time, gently clean the area with soap and water. If the bandage is difficult to remove, getting the bandage wet in the shower will sometimes help soften the adhesive and allow it to be removed more easily.   You will now need to cleanse this area daily in the shower with gentle soap. There is no need to scrub the area. You will need to apply plain Vaseline ointment (this is over the counter and not a prescription) to the site for up to 2 weeks followed by a clean appropriately sized bandage to area.  Non stick dressing and paper tape (or Hypafix) are recommended for sensitive skin but a bandaid is fine if it covers the area well.  In general, sutures (stitches) are removed in about 5-7 days for face wounds and in about 12-14 days for the body wounds.  Your dermatologist wants you to return for " "suture removal in 14 DAYS.     General Restrictions  For TWO (2) DAYS:  You will need to take it very easy as this time is highest risk for bleeding. Being a \"couch potato\" during these two days is generally recommended.   For surgeries on the face/neck/scalp: Avoid leaning down to pick things up off the floor as this brings blood up to your head. Instead, squat down to pick things up.     For TWO WEEKS (14 DAYS):   No heavy lifting (anything greater than 10 pounds)   You can start to do slow, gentle activities such as slow walking but nothing to increase your heart rate and blood pressure too much (such as cardiovascular exercise).  It is important to take it easy as there is still a risk for bleeding and a high risk popping of stitches open during this time.     Site Specific Restrictions  If we did surgery near your eyes (including the nose, forehead, front part of your scalp, cheeks): It is VERY common to get a large amount of swelling around your eyes (puffy eyes). Although less frequent, this can be enough to swell your eyes shut and can also come along with bruising. This should not hurt and is very expected and normal. It is typically worst at ~ 3 days out from your surgery and dramatically better 1 week post-operatively.   If we did surgery around your nose: No blowing your nose as this puts you at higher risk of popping stitches durign this time. Instead dab under your nose with a tissue or use a Q-tip inside your nose.  If we did surgery on the skin above or below your lip or your lip itself: No sipping from straws as this uses a lot of the muscles around your mouth and increases the risk of popping stitches during this time.    Managing Your Pain After Surgery  You can expect to have some pain after surgery. This is normal. The pain is typically worse the first two days after surgery, and quickly begins to get better.   You can use heating pads or ice packs on your incisions to help reduce your pain. " "  The best strategy for controlling your pain after surgery is \"around the clock\" pain control. You can take \"over-the-counter\" (non-prescription) Acetaminophen (Tylenol) for discomfort, unless you have been told not to by your physician.  If you are taking Tylenol at the maximum dose, you can alternate Tylenol with Advil/Motrin (ibuprofen) as long as there are no contraindications.  Alternating these medications with each other (I.e., Tylenol followed by Motrin/Advil) allows you to maximize your pain control.  To alternate these medications properly, you will take a dose of pain medication every three hours, alternating Tylenol (acetaminophen) and Advil/Motrin (ibuprofen).  Start by taking 650 mg of Tylenol (2 pills of 325 mg)  3 hours later take 600 mg of Motrin (3 pills of 200 mg)  3 hours after taking the Motrin take 650 mg of Tylenol  3 hours after that take 600 mg of Motrin.    As an example, if your first dose of Tylenol (acetaminophen) is at 12:00 PM, then you would alternate with Motrin as directed below, continuing usually for no more than a total of 48 hours straight:     12:00 PM  Tylenol 650 mg (2 pills of 325 mg)    3:00 PM  Motrin 600 mg (3 pills of 200 mg)    6:00 PM  Tylenol 650 mg (2 pills of 325 mg)    9:00 PM  Motrin 600 mg (3 pills of 200 mg)      WARNING:  Do NOT take more than 4000 mg of Tylenol or 3200 mg of Motrin in a \"24-hour\" period.       What if you still have pain?    If you have pain that is not controlled with the over-the-counter pain medications (Tylenol and Motrin/Advil), do not hesitate to call our staff using the number provided. We will help make sure you are managing your pain in the best way possible, and if necessary, we can provide a prescription for additional pain medication.     Call our office IMMEDIATELY with any signs of wound infection.  This includes fever, chills, increasing redness, warmth, tenderness, severe discomfort/pain, or pus or foul smell coming from the " wound. . [facility] can be directly at [phone] (SKIN) and ask for the on-call Dermatologist covering surgery/Mohs.    If Bleeding is Noticed  If bleeding is soaking through the bandage, remove the bandage and see where the bleeding is coming from.  Place a clean cloth over the area and apply firm pressure directly to the area that is bleeding for thirty minutes.    Check the wound ONLY after 30 minutes of direct pressure; do not cheat and sneak a peak, as that does not count (i.e., resets the clock back to zero).  If bleeding persists after 30 minutes of legitimate direct pressure, then try one more round of direct pressure to the area.    Should bleeding become heavier or not stop after the second application of direct pressure for 30 minutes, then call [facility] directly at [phone] (SKIN) and ask to speak with the on-call Dermatologist covering surgery/Mohs.  If after hours, go to your nearest Emergency Room or Urgent Care and have the team call [facility] directly at [phone] (SKIN); you will be connected to our after hours team.

## 2024-10-14 PROCEDURE — 88305 TISSUE EXAM BY PATHOLOGIST: CPT | Performed by: STUDENT IN AN ORGANIZED HEALTH CARE EDUCATION/TRAINING PROGRAM

## 2024-10-24 ENCOUNTER — OFFICE VISIT (OUTPATIENT)
Dept: DERMATOLOGY | Facility: CLINIC | Age: 34
End: 2024-10-24
Payer: COMMERCIAL

## 2024-10-24 VITALS — BODY MASS INDEX: 25.61 KG/M2 | TEMPERATURE: 98.2 F | HEIGHT: 68 IN | WEIGHT: 169 LBS

## 2024-10-24 DIAGNOSIS — D18.01 CHERRY ANGIOMA: ICD-10-CM

## 2024-10-24 DIAGNOSIS — L82.1 SEBORRHEIC KERATOSIS: ICD-10-CM

## 2024-10-24 DIAGNOSIS — D22.9 MULTIPLE MELANOCYTIC NEVI: ICD-10-CM

## 2024-10-24 DIAGNOSIS — C44.91 BASAL CELL CARCINOMA (BCC), UNSPECIFIED SITE: ICD-10-CM

## 2024-10-24 DIAGNOSIS — L81.4 LENTIGO: Primary | ICD-10-CM

## 2024-10-24 PROCEDURE — 99213 OFFICE O/P EST LOW 20 MIN: CPT | Performed by: DERMATOLOGY

## 2024-10-24 NOTE — PATIENT INSTRUCTIONS
"HISTORY OF BASAL CELL CARCINOMA      Assessment and Plan:  Based on a thorough discussion of this condition and the management approach to it (including a comprehensive discussion of the known risks, side effects and potential benefits of treatment), the patient (family) agrees to implement the following specific plan:  When outside we recommend using a wide brim hat, sunglasses, long sleeve and pants, sunscreen with SPF 30+ with reapplication every 2 hours, or SPF specific clothing   Benign, reassured  Annual skin check    How can basal cell carcinoma be prevented?  The most important way to prevent BCC is to avoid sunburn. This is especially important in childhood and early life. Fair skinned individuals and those with a personal or family history of BCC should protect their skin from sun exposure daily, year-round and lifelong.  Stay indoors or under the shade in the middle of the day   Wear covering clothing   Apply high protection factor SPF50+ broad-spectrum sunscreens generously to exposed skin if outdoors   Avoid indoor tanning (sun beds, solaria)  Oral nicotinamide (vitamin B3) in a dose of 500 mg twice daily may reduce the number and severity of BCCs.    What is the outlook for basal cell carcinoma?  Most BCCs are cured by treatment. Cure is most likely if treatment is undertaken when the lesion is small.  About 50% of people with BCC develop a second one within 3 years of the first. They are also at increased risk of other skin cancers, especially melanoma. Regular self-skin examinations and long-term annual skin checks by an experienced health professional are recommended.     MELANOCYTIC NEVI (\"Moles\")    Assessment and Plan:  Based on a thorough discussion of this condition and the management approach to it (including a comprehensive discussion of the known risks, side effects and potential benefits of treatment), the patient (family) agrees to implement the following specific plan:  When outside we " "recommend using a wide brim hat, sunglasses, long sleeve and pants, sunscreen with SPF 30+ with reapplication every 2 hours, or SPF specific clothing   Benign, reassured  For 2 spots as noted above, will re check in 3 month and consider biopsy at that time if any changes noted.     Melanocytic Nevi  Melanocytic nevi (\"moles\") are tan or brown, raised or flat areas of the skin which have an increased number of melanocytes. Melanocytes are the cells in our body which make pigment and account for skin color.    Some moles are present at birth (I.e., \"congenital nevi\"), while others come up later in life (i.e., \"acquired nevi\").  The sun can stimulate the body to make more moles.  Sunburns are not the only thing that triggers more moles.  Chronic sun exposure can do it too.     Clinically distinguishing a healthy mole from melanoma may be difficult, even for experienced dermatologists. The \"ABCDE's\" of moles have been suggested as a means of helping to alert a person to a suspicious mole and the possible increased risk of melanoma.  The suggestions for raising alert are as follows:    Asymmetry: Healthy moles tend to be symmetric, while melanomas are often asymmetric.  Asymmetry means if you draw a line through the mole, the two halves do not match in color, size, shape, or surface texture. Asymmetry can be a result of rapid enlargement of a mole, the development of a raised area on a previously flat lesion, scaling, ulceration, bleeding or scabbing within the mole.  Any mole that starts to demonstrate \"asymmetry\" should be examined promptly by a board certified dermatologist.     Border: Healthy moles tend to have discrete, even borders.  The border of a melanoma often blends into the normal skin and does not sharply delineate the mole from normal skin.  Any mole that starts to demonstrate \"uneven borders\" should be examined promptly by a board certified dermatologist.     Color: Healthy moles tend to be one color " "throughout.  Melanomas tend to be made up of different colors ranging from dark black, blue, white, or red.  Any mole that demonstrates a color change should be examined promptly by a board certified dermatologist.     Diameter: Healthy moles tend to be smaller than 0.6 cm in size; an exception are \"congenital nevi\" that can be larger.  Melanomas tend to grow and can often be greater than 0.6 cm (1/4 of an inch, or the size of a pencil eraser). This is only a guideline, and many normal moles may be larger than 0.6 cm without being unhealthy.  Any mole that starts to change in size (small to bigger or bigger to smaller) should be examined promptly by a board certified dermatologist.     Evolving: Healthy moles tend to \"stay the same.\"  Melanomas may often show signs of change or evolution such as a change in size, shape, color, or elevation.  Any mole that starts to itch, bleed, crust, burn, hurt, or ulcerate or demonstrate a change or evolution should be examined promptly by a board certified dermatologist.      LENTIGO    Assessment and Plan:  Based on a thorough discussion of this condition and the management approach to it (including a comprehensive discussion of the known risks, side effects and potential benefits of treatment), the patient (family) agrees to implement the following specific plan:  When outside we recommend using a wide brim hat, sunglasses, long sleeve and pants, sunscreen with SPF 30+ with reapplication every 2 hours, or SPF specific clothing     What is a lentigo?  A lentigo is a pigmented flat or slightly raised lesion with a clearly defined edge. Unlike an ephelis (freckle), it does not fade in the winter months. There are several kinds of lentigo.  The name lentigo originally referred to its appearance resembling a small lentil. The plural of lentigo is lentigines, although “lentigos” is also in common use.    Who gets lentigines?  Lentigines can affect males and females of all ages and " "races. Solar lentigines are especially prevalent in fair skinned adults. Lentigines associated with syndromes are present at birth or arise during childhood.    What causes lentigines?  Common forms of lentigo are due to exposure to ultraviolet radiation:  Sun damage including sunburn   Indoor tanning   Phototherapy, especially photochemotherapy (PUVA)    Ionizing radiation, eg radiation therapy, can also cause lentigines.  Several familial syndromes associated with widespread lentigines originate from mutations in Atilio-MAP kinase, mTOR signaling and PTEN pathways.    What is the treatment for lentigines?  Most lentigines are left alone. Attempts to lighten them may not be successful. The following approaches are used:  SPF 50+ broad-spectrum sunscreen   Hydroquinone bleaching cream   Alpha hydroxy acids   Vitamin C   Retinoids   Azelaic acid   Chemical peels  Individual lesions can be permanently removed using:  Cryotherapy   Intense pulsed light   Pigment lasers    How can lentigines be prevented?  Lentigines associated with exposure ultraviolet radiation can be prevented by very careful sun protection. Clothing is more successful at preventing new lentigines than are sunscreens.    What is the outlook for lentigines?  Lentigines usually persist. They may increase in number with age and sun exposure. Some in sun-protected sites may fade and disappear.    JUSTIN ANGIOMAS    Assessment and Plan:  Based on a thorough discussion of this condition and the management approach to it (including a comprehensive discussion of the known risks, side effects and potential benefits of treatment), the patient (family) agrees to implement the following specific plan:  Monitor for changes  Benign, reassured    Assessment and Plan:    Cherry angioma, also known as Krueger de Sreekanth spots, are benign vascular skin lesions. A \"cherry angioma\" is a firm red, blue or purple papule, 0.1-1 cm in diameter. When thrombosed, they can appear " black in colour until evaluated with a dermatoscope when the red or purple colour is more easily seen. Cherry angioma may develop on any part of the body but most often appear on the scalp, face, lips and trunk.  An angioma is due to proliferating endothelial cells; these are the cells that line the inside of a blood vessel.    Angiomas can arise in early life or later in life; the most common type of angioma is a cherry angioma.  Cherry angiomas are very common in males and females of any age or race. They are more noticeable in white skin than in skin of colour. They markedly increase in number from about the age of 40. There may be a family history of similar lesions. Eruptive cherry angiomas have been rarely reported to be associated with internal malignancy. The cause of angiomas is unknown. Genetic analysis of cherry angiomas has shown that they frequently carry specific somatic missense mutations in the GNAQ and GNA11 (Q209H) genes, which are involved in other vascular and melanocytic proliferations.      SEBORRHEIC KERATOSIS; NON-INFLAMED      Assessment and Plan:  Based on a thorough discussion of this condition and the management approach to it (including a comprehensive discussion of the known risks, side effects and potential benefits of treatment), the patient (family) agrees to implement the following specific plan:  Monitor for changes  Benign, reassured    Seborrheic Keratosis  A seborrheic keratosis is a harmless warty spot that appears during adult life as a common sign of skin aging.  Seborrheic keratoses can arise on any area of skin, covered or uncovered, with the usual exception of the palms and soles. They do not arise from mucous membranes. Seborrheic keratoses can have highly variable appearance.      Seborrheic keratoses are extremely common. It has been estimated that over 90% of adults over the age of 60 years have one or more of them. They occur in males and females of all races,  "typically beginning to erupt in the 30s or 40s. They are uncommon under the age of 20 years.  The precise cause of seborrhoeic keratoses is not known.  Seborrhoeic keratoses are considered degenerative in nature. As time goes by, seborrheic keratoses tend to become more numerous. Some people inherit a tendency to develop a very large number of them; some people may have hundreds of them.      There is no easy way to remove multiple lesions on a single occasion.  Unless a specific lesion is \"inflamed\" and is causing pain or stinging/burning or is bleeding, most insurance companies do not authorize treatment.      "

## 2024-10-24 NOTE — PROGRESS NOTES
"[facility] Note     Patient Name: Tabitha Jaimes  Encounter Date: 10/24/24     Have you been cared for by a [facility] Dermatologist in the last 3 years and, if so, which description applies to you?    Yes.  I have been here within the last 3 years, and my medical history has NOT changed since that time.  I am FEMALE/of child-bearing potential.    REVIEW OF SYSTEMS:  Have you recently had or currently have any of the following? No changes in my recent health.   PAST MEDICAL HISTORY:  Have you personally ever had or currently have any of the following?  If \"YES,\" then please provide more detail. No changes in my medical history.   HISTORY OF IMMUNOSUPPRESSION: Do you have a history of any of the following:  Systemic Immunosuppression such as Diabetes, Biologic or Immunotherapy, Chemotherapy, Organ Transplantation, Bone Marrow Transplantation or Prednisone?  No     Answering \"YES\" requires the addition of the dotphrase \"IMMUNOSUPPRESSED\" as the first diagnosis of the patient's visit.   FAMILY HISTORY:  Any \"first degree relatives\" (parent, brother, sister, or child) with the following?    No changes in my family's known health.   PATIENT EXPERIENCE:    Do you want the Dermatologist to perform a COMPLETE skin exam today including a clinical examination under the \"bra and underwear\" areas?  Yes  If necessary, do we have your permission to call and leave a detailed message on your Preferred Phone number that includes your specific medical information?  Yes      No Known Allergies   Current Outpatient Medications:     cetirizine (ZyrTEC) 10 mg tablet, Take 10 mg by mouth daily (Patient not taking: Reported on 1/10/2024), Disp: , Rfl:     fluticasone (FLONASE) 50 mcg/act nasal spray, 1 spray into each nostril if needed for allergies Taking as needed, Disp: , Rfl:     MAGNESIUM GLYCINATE PO, Take by mouth, Disp: , Rfl:     meloxicam (Mobic) 7.5 mg tablet, Take 1 tablet (7.5 mg total) by mouth daily " (Patient not taking: Reported on 7/3/2024), Disp: 30 tablet, Rfl: 0    multivitamin (THERAGRAN) TABS, Take 1 tablet by mouth daily, Disp: , Rfl:     Prenatal Vit-Fe Fumarate-FA (PRENATAL VITAMIN PO), Take by mouth, Disp: , Rfl:     triamcinolone (KENALOG) 0.1 % ointment, Apply topically 2 (two) times a day, Disp: 80 g, Rfl: 0          Whom besides the patient is providing clinical information about today's encounter?   NO ADDITIONAL HISTORIAN (patient alone provided history)    Physical Exam and Assessment/Plan by Diagnosis:     HISTORY OF BASAL CELL CARCINOMA    Physical Exam:  Anatomic Location Affected: Left upper arm BCC (SUPERFICIAL TYPE) [id number]     Morphological Description of scar:  Well healed scar  Suspected Recurrence: No  Pertinent Positives:  Pertinent Negatives:      Additional History of Present Condition:  History of basal cell carcinoma with no sign of recurrence    Assessment and Plan:  Based on a thorough discussion of this condition and the management approach to it (including a comprehensive discussion of the known risks, side effects and potential benefits of treatment), the patient (family) agrees to implement the following specific plan:  When outside we recommend using a wide brim hat, sunglasses, long sleeve and pants, sunscreen with SPF 30+ with reapplication every 2 hours, or SPF specific clothing   Benign, reassured  Annual skin check    How can basal cell carcinoma be prevented?  The most important way to prevent BCC is to avoid sunburn. This is especially important in childhood and early life. Fair skinned individuals and those with a personal or family history of BCC should protect their skin from sun exposure daily, year-round and lifelong.  Stay indoors or under the shade in the middle of the day   Wear covering clothing   Apply high protection factor SPF50+ broad-spectrum sunscreens generously to exposed skin if outdoors   Avoid indoor tanning (sun beds, solaria)  Oral  "nicotinamide (vitamin B3) in a dose of 500 mg twice daily may reduce the number and severity of BCCs.    What is the outlook for basal cell carcinoma?  Most BCCs are cured by treatment. Cure is most likely if treatment is undertaken when the lesion is small.  About 50% of people with BCC develop a second one within 3 years of the first. They are also at increased risk of other skin cancers, especially melanoma. Regular self-skin examinations and long-term annual skin checks by an experienced health professional are recommended.     MELANOCYTIC NEVI (\"Moles\")    Physical Exam:  Anatomic Location Affected:   Mostly on sun-exposed areas of the trunk and extremities  Morphological Description:  Scattered, 1-4mm round to ovoid, symmetrical-appearing, even bordered, skin colored to dark brown macules/papules, mostly in sun-exposed areas  Monitoring the following lesions with close follow up:  Central chest 0.6 x 0.3 cm pink papule. Lesion is new with the past year and patients outlying mole  Left inner gluteal cleft 1.1 cm x 0.8 cm pink plaque. Lesion changed during recent pregnancy.  Pertinent Positives:  Pertinent Negatives:    Additional History of Present Condition:  34 year old female patient presents today for follow up for skin check and removal of stiches from BCC procedure done on 10/9/24.    Assessment and Plan:  Based on a thorough discussion of this condition and the management approach to it (including a comprehensive discussion of the known risks, side effects and potential benefits of treatment), the patient (family) agrees to implement the following specific plan:  When outside we recommend using a wide brim hat, sunglasses, long sleeve and pants, sunscreen with SPF 30+ with reapplication every 2 hours, or SPF specific clothing   Benign, reassured  For 2 spots as noted above, will re check in 3 month and consider biopsy at that time if any changes noted.      LENTIGO    Physical Exam:  Anatomic Location " "Affected:  trunk, arms  Morphological Description:  Light brown macules  Pertinent Positives:  Pertinent Negatives:    Assessment and Plan:  Based on a thorough discussion of this condition and the management approach to it (including a comprehensive discussion of the known risks, side effects and potential benefits of treatment), the patient (family) agrees to implement the following specific plan:  When outside we recommend using a wide brim hat, sunglasses, long sleeve and pants, sunscreen with SPF 30+ with reapplication every 2 hours, or SPF specific clothing       JUSTIN ANGIOMAS    Physical Exam:  Anatomic Location Affected:  Mostly on sun-exposed areas of the trunk and extremities  Morphological Description:  Scattered cherry red, 1-4 mm papules.  Pertinent Positives:  Pertinent Negatives:    Assessment and Plan:  Based on a thorough discussion of this condition and the management approach to it (including a comprehensive discussion of the known risks, side effects and potential benefits of treatment), the patient (family) agrees to implement the following specific plan:  Monitor for changes  Benign, reassured        SEBORRHEIC KERATOSIS; NON-INFLAMED    Physical Exam:  Anatomic Location Affected: Mostly on sun-exposed areas of the trunk and extremities  Morphological Description:  Flat and raised, waxy, smooth to warty textured, yellow to brownish-grey to dark brown to blackish, discrete, \"stuck-on\" appearing papules.  Pertinent Positives:  Pertinent Negatives:    Assessment and Plan:  Based on a thorough discussion of this condition and the management approach to it (including a comprehensive discussion of the known risks, side effects and potential benefits of treatment), the patient (family) agrees to implement the following specific plan:  Monitor for changes  Benign, reassured        Suture removal    Date/Time: 10/24/2024 10:30 AM    Performed by: Munira Mayorga  Authorized by: Antonietta Henson MD  " Universal Protocol:  procedure performed by consultantConsent: Verbal consent obtained. Written consent not obtained.  Consent given by: patient  Timeout called at: 10/24/2024 11:00 AM.  Patient understanding: patient states understanding of the procedure being performed  Patient identity confirmed: verbally with patient      Patient location:  Clinic  Location:     Laterality:  Left    Location:  Upper extremity    Upper extremity location:  Arm    Arm location:  L upper arm  Procedure details:     Tools used:  Suture removal kit    Wound appearance:  No sign(s) of infection and good wound healing    Number of sutures removed:  8  Post-procedure details:     Post-removal:  No dressing applied    Patient tolerance of procedure:  Tolerated well, no immediate complications         Scribe Attestation      I,:  Munira Mayorga am acting as a scribe while in the presence of the attending physician.:       I,:  Anni Quintana MD personally performed the services described in this documentation    as scribed in my presence.:            Antonietta Henson MD  Dermatology, PGY-4

## 2024-10-28 ENCOUNTER — TELEPHONE (OUTPATIENT)
Dept: BEHAVIORAL/MENTAL HEALTH CLINIC | Facility: CLINIC | Age: 34
End: 2024-10-28

## 2024-10-28 NOTE — TELEPHONE ENCOUNTER
LSW called patient about rescheduling new patient appointment due to LSW not being in office day of scheduled appointment. Patient was agreeable to 11/25/24 at 1PM. Reports she works from home. Patient verbalized she knows location of office and has no other needs at this time.  LSW messaged admin staff to move patient into LSW administrative slot.

## 2024-12-06 ENCOUNTER — APPOINTMENT (OUTPATIENT)
Dept: LAB | Facility: HOSPITAL | Age: 34
End: 2024-12-06
Attending: FAMILY MEDICINE
Payer: COMMERCIAL

## 2024-12-06 DIAGNOSIS — R17 ELEVATED BILIRUBIN: ICD-10-CM

## 2024-12-06 LAB
ALBUMIN SERPL BCG-MCNC: 4.3 G/DL (ref 3.5–5)
ALP SERPL-CCNC: 52 U/L (ref 34–104)
ALT SERPL W P-5'-P-CCNC: 23 U/L (ref 7–52)
ANION GAP SERPL CALCULATED.3IONS-SCNC: 5 MMOL/L (ref 4–13)
AST SERPL W P-5'-P-CCNC: 21 U/L (ref 13–39)
BILIRUB SERPL-MCNC: 0.94 MG/DL (ref 0.2–1)
BUN SERPL-MCNC: 12 MG/DL (ref 5–25)
CALCIUM SERPL-MCNC: 9.2 MG/DL (ref 8.4–10.2)
CHLORIDE SERPL-SCNC: 102 MMOL/L (ref 96–108)
CO2 SERPL-SCNC: 32 MMOL/L (ref 21–32)
CREAT SERPL-MCNC: 0.66 MG/DL (ref 0.6–1.3)
GFR SERPL CREATININE-BSD FRML MDRD: 115 ML/MIN/1.73SQ M
GLUCOSE P FAST SERPL-MCNC: 86 MG/DL (ref 65–99)
POTASSIUM SERPL-SCNC: 4.1 MMOL/L (ref 3.5–5.3)
PROT SERPL-MCNC: 6.9 G/DL (ref 6.4–8.4)
SODIUM SERPL-SCNC: 139 MMOL/L (ref 135–147)

## 2024-12-06 PROCEDURE — 36415 COLL VENOUS BLD VENIPUNCTURE: CPT

## 2024-12-06 PROCEDURE — 80053 COMPREHEN METABOLIC PANEL: CPT

## 2025-01-09 ENCOUNTER — OFFICE VISIT (OUTPATIENT)
Dept: DERMATOLOGY | Facility: CLINIC | Age: 35
End: 2025-01-09

## 2025-01-09 VITALS — HEIGHT: 68 IN | WEIGHT: 169 LBS | BODY MASS INDEX: 25.61 KG/M2 | TEMPERATURE: 98.8 F

## 2025-01-09 DIAGNOSIS — D48.9 NEOPLASM OF UNCERTAIN BEHAVIOR: Primary | ICD-10-CM

## 2025-01-09 NOTE — PATIENT INSTRUCTIONS
Assessment and Plan:  Based on a thorough discussion of this condition and the management approach to it (including a comprehensive discussion of the known risks, side effects and potential benefits of treatment), the patient (family) agrees to implement the following specific plan:  Patient is currently uninsured, will likely get insurance again in Feburary, plan to return in March after insurance for biopsy of 2 lesions  Use sun protection.  Apply SPF 30 or higher at least three times a day.  Wear sun protecting clothing and hats.

## 2025-01-09 NOTE — PROGRESS NOTES
"[facility] Note     Patient Name: Tabitha Jaimes  Encounter Date: 1/9/25     Have you been cared for by a [facility] Dermatologist in the last 3 years and, if so, which description applies to you?    Yes.  I have been here within the last 3 years, and my medical history has NOT changed since that time.  I am FEMALE/of child-bearing potential.    REVIEW OF SYSTEMS:  Have you recently had or currently have any of the following? No changes in my recent health.   PAST MEDICAL HISTORY:  Have you personally ever had or currently have any of the following?  If \"YES,\" then please provide more detail. No changes in my medical history.   HISTORY OF IMMUNOSUPPRESSION: Do you have a history of any of the following:  Systemic Immunosuppression such as Diabetes, Biologic or Immunotherapy, Chemotherapy, Organ Transplantation, Bone Marrow Transplantation or Prednisone?  No     Answering \"YES\" requires the addition of the dotphrase \"IMMUNOSUPPRESSED\" as the first diagnosis of the patient's visit.   FAMILY HISTORY:  Any \"first degree relatives\" (parent, brother, sister, or child) with the following?    No changes in my family's known health.   PATIENT EXPERIENCE:    Do you want the Dermatologist to perform a COMPLETE skin exam today including a clinical examination under the \"bra and underwear\" areas?  NO  If necessary, do we have your permission to call and leave a detailed message on your Preferred Phone number that includes your specific medical information?  Yes      No Known Allergies   Current Outpatient Medications:     fluticasone (FLONASE) 50 mcg/act nasal spray, 1 spray into each nostril if needed for allergies Taking as needed, Disp: , Rfl:     MAGNESIUM GLYCINATE PO, Take by mouth, Disp: , Rfl:     multivitamin (THERAGRAN) TABS, Take 1 tablet by mouth daily, Disp: , Rfl:     cetirizine (ZyrTEC) 10 mg tablet, Take 10 mg by mouth daily (Patient not taking: Reported on 1/10/2024), Disp: , Rfl:     meloxicam " (Mobic) 7.5 mg tablet, Take 1 tablet (7.5 mg total) by mouth daily (Patient not taking: Reported on 7/3/2024), Disp: 30 tablet, Rfl: 0    Prenatal Vit-Fe Fumarate-FA (PRENATAL VITAMIN PO), Take by mouth, Disp: , Rfl:     triamcinolone (KENALOG) 0.1 % ointment, Apply topically 2 (two) times a day (Patient not taking: Reported on 10/24/2024), Disp: 80 g, Rfl: 0          Whom besides the patient is providing clinical information about today's encounter?   NO ADDITIONAL HISTORIAN (patient alone provided history)    Physical Exam and Assessment/Plan by Diagnosis:    HISTORY OF BASAL CELL CARCINOMA    Physical Exam:  Anatomic Location Affected:  left upper arm   Morphological Description of scar:  well healed  Suspected Recurrence: No  Pertinent Positives:  Pertinent Negatives:      Additional History of Present Condition:  History of basal cell carcinoma with no sign of recurrence. Excised by Dr. Montanez (accession #; [id number]) on 10/9/24.     Assessment and Plan:  Based on a thorough discussion of this condition and the management approach to it (including a comprehensive discussion of the known risks, side effects and potential benefits of treatment), the patient (family) agrees to implement the following specific plan:  When outside we recommend using a wide brim hat, sunglasses, long sleeve and pants, sunscreen with SPF 30+ with reapplication every 2 hours, or SPF specific clothing   Benign, reassured  Annual skin check    How can basal cell carcinoma be prevented?  The most important way to prevent BCC is to avoid sunburn. This is especially important in childhood and early life. Fair skinned individuals and those with a personal or family history of BCC should protect their skin from sun exposure daily, year-round and lifelong.  Stay indoors or under the shade in the middle of the day   Wear covering clothing   Apply high protection factor SPF50+ broad-spectrum sunscreens generously to exposed skin if outdoors  "  Avoid indoor tanning (sun beds, solaria)  Oral nicotinamide (vitamin B3) in a dose of 500 mg twice daily may reduce the number and severity of BCCs.    What is the outlook for basal cell carcinoma?  Most BCCs are cured by treatment. Cure is most likely if treatment is undertaken when the lesion is small.  About 50% of people with BCC develop a second one within 3 years of the first. They are also at increased risk of other skin cancers, especially melanoma. Regular self-skin examinations and long-term annual skin checks by an experienced health professional are recommended.       MULTIPLE MELANOCYTIC NEVI (\"Moles\")     Physical Exam:  Anatomic Location Affected: Trunk and extremities  Morphological Description:  Scattered, round to ovoid, symmetrical-appearing, even bordered, skin colored to dark brown macules/papules  Denies pain, itch, bleeding. No treatments tried. Present for years. Present constantly; no modifying factors which make it worse or better. Denies actively changing or growing moles.   Here today for follow up for lesions noted on last exam:   Central chest: previously 0.6 cm x 0.3 cm pink papule. Patient does not feel like it has changed. TODAY: 0.4 cm x 0.5 cm pink papule. Ddx: melanocytic nevus vs spitzoid lesion  Left inner gluteal cleft: previously 1.1 cm x 0.8 cm pink plaque. Patient feels like unchanged TODAY: 1 cm x 0.8 cm pink plaque. Ddx verrucous keratosis r/o SCCIS      Assessment and Plan:  Based on a thorough discussion of this condition and the management approach to it (including a comprehensive discussion of the known risks, side effects and potential benefits of treatment), the patient (family) agrees to implement the following specific plan:  Patient is currently uninsured, will likely get insurance again in Feburary, plan to return in late February after insurance for biopsy of 2 lesions  Use sun protection.  Apply SPF 30 or higher at least three times a day.  Wear sun " protecting clothing and hats.        Scribe Attestation      I,:  Sameer Perez am acting as a scribe while in the presence of the attending physician.:       I,:  Antonietta Henson MD personally performed the services described in this documentation    as scribed in my presence.:

## 2025-01-22 ENCOUNTER — PATIENT MESSAGE (OUTPATIENT)
Dept: FAMILY MEDICINE CLINIC | Facility: CLINIC | Age: 35
End: 2025-01-22

## 2025-01-22 DIAGNOSIS — Z11.1 SCREENING-PULMONARY TB: Primary | ICD-10-CM

## 2025-02-17 ENCOUNTER — TELEPHONE (OUTPATIENT)
Age: 35
End: 2025-02-17

## 2025-02-17 NOTE — TELEPHONE ENCOUNTER
Pt has upcoming appt next Monday 2/22 and has a very high deductible on her insurance    Pt would like a self pay estimate for the visit and 2 biopsies if possible, as well as the dx/proc codes to research with her insurance to compare.    Advised pt I will call her back with this information at [phone]

## 2025-02-17 NOTE — TELEPHONE ENCOUNTER
Contacted patient and advised of the possible codes that could be utilized for her visit. Provided CPT codes 35629, 13371, 90614, & 25667. Advised patient that these are just for the in office codes and that I was unable to provide information regarding what the lab would bill her as that is dependant on the pathologist who reads the slides. Patient understood and asked to reschedule her appointment as she does not think this is feasible at the moment. Scheduled next available appt for 4/15 @ 130 and told patient to contact the office for any further questions.

## 2025-04-06 ENCOUNTER — TELEMEDICINE (OUTPATIENT)
Dept: OTHER | Facility: HOSPITAL | Age: 35
End: 2025-04-06
Payer: COMMERCIAL

## 2025-04-06 DIAGNOSIS — K13.6 IRRITATION OF ORAL CAVITY: Primary | ICD-10-CM

## 2025-04-06 PROCEDURE — 99213 OFFICE O/P EST LOW 20 MIN: CPT | Performed by: PHYSICIAN ASSISTANT

## 2025-04-06 RX ORDER — CLOTRIMAZOLE 10 MG/1
10 LOZENGE ORAL
Qty: 35 TABLET | Refills: 0 | Status: SHIPPED | OUTPATIENT
Start: 2025-04-06 | End: 2025-04-13

## 2025-04-06 NOTE — PROGRESS NOTES
Virtual Regular Visit  Name: Tabitha Jaimes      : 1990      MRN: [id number]  Encounter Provider: Shannon D Severino, PA-C  Encounter Date: 2025   Encounter department: VIRTUAL CARE       Verification of patient location:  Patient is located at Home in the following state in which I hold an active license [place] :  Assessment & Plan  Irritation of oral cavity    Orders:    clotrimazole (MYCELEX) 10 mg rivas; Take 1 tablet (10 mg total) by mouth 5 (five) times a day for 7 days        Encounter provider Shannon D Severino, PA-C    The patient was identified by name and date of birth. Tabitha Jaimes was informed that this is a telemedicine visit and that the visit is being conducted through the Epic Embedded platform. She agrees to proceed..  My office door was closed. No one else was in the room. She acknowledged consent and understanding of privacy and security of the video platform. The patient has agreed to participate and understands they can discontinue the visit at any time.    Patient is aware this is a billable service.     History obtained from: patient  History of Present Illness     Pt reports a few days of a weird feeling in her mouth. Has a  she has been using for about 1 month. Has odd feeling to inner cheeks and upper lip/buccal surface, throat a little irritated as well. Otherwise feels fine. Traveling to [place] tomorrow. Daughter has ringworm. Cleans her mouth guard. No recent abx or steroids.       Review of Systems   Constitutional:  Negative for fever.   HENT:  Positive for mouth sores and sore throat. Negative for nosebleeds.    Eyes:  Negative for redness.   Respiratory:  Negative for shortness of breath.    Cardiovascular:  Negative for chest pain.   Gastrointestinal:  Negative for blood in stool.   Genitourinary:  Negative for hematuria.   Musculoskeletal:  Negative for gait problem.   Skin:  Negative for rash.   Neurological:  Negative for seizures.   Psychiatric/Behavioral:   Negative for behavioral problems.        Objective   There were no vitals taken for this visit.    Physical Exam  Constitutional:       General: She is not in acute distress.     Appearance: Normal appearance. She is not toxic-appearing.   HENT:      Head: Normocephalic and atraumatic.      Nose: No rhinorrhea.      Mouth/Throat:      Mouth: Mucous membranes are moist.      Comments: Buccal surface adjacent to symone duct is superficial ulceration 2/2 trauma vs thin white coating. Midline buccal surface of upper lip is a 1-2 mm abrasion just distal to frenulum  Eyes:      Conjunctiva/sclera: Conjunctivae normal.   Pulmonary:      Effort: Pulmonary effort is normal. No respiratory distress.      Breath sounds: No wheezing (no gross audible wheeze through computer).   Musculoskeletal:      Cervical back: Normal range of motion.   Skin:     Findings: No rash (on face or neck).   Neurological:      Mental Status: She is alert.      Cranial Nerves: No dysarthria or facial asymmetry.   Psychiatric:         Mood and Affect: Mood normal.         Behavior: Behavior normal.         Visit Time  Total Visit Duration: 15 minutes not including the time spent for establishing the audio/video connection.

## 2025-04-06 NOTE — PATIENT INSTRUCTIONS
As discussed, this could just be trauma from your  causing these symptoms, however we will try treatment for thrush. If no improvement or symptoms return/worsen with  use, follow up with Dentist.     [facility] Phone number is [phone] if you need assistance or have further questions  Go to the emergency department if you have worsening swelling, difficulty swallowing due to swelling, or difficulty breathing.

## 2025-04-15 ENCOUNTER — OFFICE VISIT (OUTPATIENT)
Dept: DERMATOLOGY | Facility: CLINIC | Age: 35
End: 2025-04-15

## 2025-04-15 VITALS — BODY MASS INDEX: 24.4 KG/M2 | HEIGHT: 68 IN | WEIGHT: 161 LBS | TEMPERATURE: 97.6 F

## 2025-04-15 DIAGNOSIS — Z85.828 HISTORY OF BASAL CELL CARCINOMA: ICD-10-CM

## 2025-04-15 DIAGNOSIS — D48.9 NEOPLASM OF UNCERTAIN BEHAVIOR: Primary | ICD-10-CM

## 2025-04-15 PROCEDURE — 88305 TISSUE EXAM BY PATHOLOGIST: CPT | Performed by: STUDENT IN AN ORGANIZED HEALTH CARE EDUCATION/TRAINING PROGRAM

## 2025-04-15 PROCEDURE — 88342 IMHCHEM/IMCYTCHM 1ST ANTB: CPT | Performed by: STUDENT IN AN ORGANIZED HEALTH CARE EDUCATION/TRAINING PROGRAM

## 2025-04-15 PROCEDURE — NC001 PR NO CHARGE: Performed by: DERMATOLOGY

## 2025-04-15 NOTE — PROGRESS NOTES
"[facility]atology [facility] Note     Patient Name: Tabitha Jaimes  Encounter Date: 4/15/25     Have you been cared for by a [facility] Dermatologist in the last 3 years and, if so, which description applies to you?    Yes.  I have been here within the last 3 years, and my medical history has NOT changed since that time.  I am FEMALE/of child-bearing potential.    REVIEW OF SYSTEMS:  Have you recently had or currently have any of the following? No changes in my recent health.   PAST MEDICAL HISTORY:  Have you personally ever had or currently have any of the following?  If \"YES,\" then please provide more detail. No changes in my medical history.   HISTORY OF IMMUNOSUPPRESSION: Do you have a history of any of the following:  Systemic Immunosuppression such as Diabetes, Biologic or Immunotherapy, Chemotherapy, Organ Transplantation, Bone Marrow Transplantation or Prednisone?  No     Answering \"YES\" requires the addition of the dotphrase \"IMMUNOSUPPRESSED\" as the first diagnosis of the patient's visit.   FAMILY HISTORY:  Any \"first degree relatives\" (parent, brother, sister, or child) with the following?    No changes in my family's known health.   PATIENT EXPERIENCE:    Do you want the Dermatologist to perform a COMPLETE skin exam today including a clinical examination under the \"bra and underwear\" areas?  NO  If necessary, do we have your permission to call and leave a detailed message on your Preferred Phone number that includes your specific medical information?  Yes      No Known Allergies   Current Outpatient Medications:     fluticasone (FLONASE) 50 mcg/act nasal spray, 1 spray into each nostril if needed for allergies Taking as needed, Disp: , Rfl:     MAGNESIUM GLYCINATE PO, Take by mouth, Disp: , Rfl:     multivitamin (THERAGRAN) TABS, Take 1 tablet by mouth daily, Disp: , Rfl:           Whom besides the patient is providing clinical information about today's encounter?   NO ADDITIONAL HISTORIAN (patient alone " provided history)    Physical Exam and Assessment/Plan by Diagnosis:    HISTORY OF BASAL CELL CARCINOMA     Physical Exam:  Anatomic Location Affected:  left upper arm        Morphological Description of scar:  well healed  Suspected Recurrence: No  Pertinent Positives:  Pertinent Negatives:        Additional History of Present Condition:  History of basal cell carcinoma with no sign of recurrence. Excised by Dr. Montanez (accession #; [id number]) on 10/9/24.      Assessment and Plan:  Based on a thorough discussion of this condition and the management approach to it (including a comprehensive discussion of the known risks, side effects and potential benefits of treatment), the patient (family) agrees to implement the following specific plan:  When outside we recommend using a wide brim hat, sunglasses, long sleeve and pants, sunscreen with SPF 30+ with reapplication every 2 hours, or SPF specific clothing   Benign, reassured  Annual skin check     How can basal cell carcinoma be prevented?  The most important way to prevent BCC is to avoid sunburn. This is especially important in childhood and early life. Fair skinned individuals and those with a personal or family history of BCC should protect their skin from sun exposure daily, year-round and lifelong.  Stay indoors or under the shade in the middle of the day   Wear covering clothing   Apply high protection factor SPF50+ broad-spectrum sunscreens generously to exposed skin if outdoors   Avoid indoor tanning (sun beds, solaria)  Oral nicotinamide (vitamin B3) in a dose of 500 mg twice daily may reduce the number and severity of BCCs.     What is the outlook for basal cell carcinoma?  Most BCCs are cured by treatment. Cure is most likely if treatment is undertaken when the lesion is small.  About 50% of people with BCC develop a second one within 3 years of the first. They are also at increased risk of other skin cancers, especially melanoma. Regular self-skin  "examinations and long-term annual skin checks by an experienced health professional are recommended.    NEOPLASM OF UNCERTAIN BEHAVIOR OF SKIN    Physical Exam:  (Anatomic Location); (Size and Morphological Description); (Differential Diagnosis):  Specimen A: left inner inner gluteal cleft; 1 cm x 0.7 cm pink plaque Ddx verrucous keratosis r/o squamous cell carcinoma in situ    Central Chest; 0.6 cm x 0.4 cm pink papule Ddx.: melanocytic nevus vs spitzoid lesion   Pertinent Positives:  Pertinent Negatives:    Additional History of Present Condition:  patient previously uninsured, here today for biopsy of 2 NUBS.    Assessment and Plan:  I have discussed with the patient that a sample of skin via a \"skin biopsy” would be potentially helpful to further make a specific diagnosis under the microscope.  Patient will see cost of first biopsy and will consider biopsy of the lesion on the central chest if patient meets deductible or if patient notices the lesion changing.   Discussed that delaying biopsy could result in worsening of lesion and possibly larger surgical margins in the future or upstaging if lesion were to be malignant. Patient understands.   Based on a thorough discussion of this condition and the management approach to it (including a comprehensive discussion of the known risks, side effects and potential benefits of treatment), the patient (family) agrees to implement the following specific plan:    Procedure:  Skin Biopsy.  After a thorough discussion of treatment options and risk/benefits/alternatives (including but not limited to local pain, scarring, dyspigmentation, blistering, possible superinfection, and inability to confirm a diagnosis via histopathology), verbal and written consent were obtained and portion of the rash was biopsied for tissue sample.  See below for consent that was obtained from patient and subsequent Procedure Note.  PROCEDURE TANGENTIAL (SHAVE) BIOPSY NOTE:    Performing " "Physician:   Anatomic Location; Clinical Description with size (cm); Pre-Op Diagnosis:   Specimen A: left inner inner gluteal cleft; 1 cm x 0.7 cm pink plaque Ddx verrucous keratosis r/o squamous cell carcinoma in situ      Post-op diagnosis: Same     Local anesthesia: 1% xylocaine with epi      Topical anesthesia: None    Hemostasis: Aluminum chloride       After obtaining informed consent  at which time there was a discussion about the purpose of biopsy  and low risks of infection and bleeding.  The area was prepped and draped in the usual fashion. Anesthesia was obtained with 1% lidocaine with epinephrine. A shave biopsy to an appropriate sampling depth was obtained by Shave (Dermablade or 15 blade) The resulting wound was covered with surgical ointment and bandaged appropriately.     The patient tolerated the procedure well without complications and was without signs of functional compromise.      Specimen has been sent for review by Dermatopathology.    Standard post-procedure care has been explained and has been included in written form within the patient's copy of Informed Consent.    INFORMED CONSENT DISCUSSION AND POST-OPERATIVE INSTRUCTIONS FOR PATIENT    I.  RATIONALE FOR PROCEDURE  I understand that a skin biopsy allows the Dermatologist to test a lesion or rash under the microscope to obtain a diagnosis.  It usually involves numbing the area with numbing medication and removing a small piece of skin; sometimes the area will be closed with sutures. In this specific procedure, sutures are not usually needed.  If any sutures are placed, then they are usually need to be removed in 2 weeks or less.    I understand that my Dermatologist recommends that a skin \"shave\" biopsy be performed today.  A local anesthetic, similar to the kind that a dentist uses when filling a cavity, will be injected with a very small needle into the skin area to be sampled.  The injected skin and tissue underneath \"will go " "to sleep” and become numb so no pain should be felt afterwards.  An instrument shaped like a tiny \"razor blade\" (shave biopsy instrument) will be used to cut a small piece of tissue and skin from the area so that a sample of tissue can be taken and examined more closely under the microscope.  A slight amount of bleeding will occur, but it will be stopped with direct pressure and a pressure bandage and any other appropriate methods.  I understands that a scar will form where the wound was created.  Surgical ointment will be applied to help protect the wound.  Sutures are not usually needed.    II.  RISKS AND POTENTIAL COMPLICATIONS   I understand the risks and potential complications of a skin biopsy include but are not limited to the following:  Bleeding  Infection  Pain  Scar/keloid  Skin discoloration  Incomplete Removal  Recurrence  Nerve Damage/Numbness/Loss of Function  Allergic Reaction to Anesthesia  Biopsies are diagnostic procedures and based on findings additional treatment or evaluation may be required  Loss or destruction of specimen resulting in no additional findings    My Dermatologist has explained to me the nature of the condition, the nature of the procedure, and the benefits to be reasonably expected compared with alternative approaches.  My Dermatologist has discussed the likelihood of major risks or complications of this procedure including the specific risks listed above, such as bleeding, infection, and scarring/keloid.  I understand that a scar is expected after this procedure.  I understand that my physician cannot predict if the scar will form a \"keloid,\" which extends beyond the borders of the wound that is created.  A keloid is a thick, painful, and bumpy scar.  A keloid can be difficult to treat, as it does not always respond well to therapy, which includes injecting cortisone directly into the keloid every few weeks.  While this usually reduces the pain and size of the scar, it does not " "eliminate it.      I understand that photographs may be taken before and after the procedure.  These will be maintained as part of the medical providers confidential records and may not be made available to me.  I further authorize the medical provider to use the photographs for teaching purposes or to illustrate scientific papers, books, or lectures if in his/her judgment, medical research, education, or science may benefit from its use.    I have had an opportunity to fully inquire about the risks and benefits of this procedure and its alternatives.   I have been given ample time and opportunity to ask questions and to seek a second opinion if I wished to do so.  I acknowledge that there have specifically been no guarantees as to the cosmetic results from the procedure.  I am aware that with any procedure there is always the possibility of an unexpected complication.    III. POST-PROCEDURAL CARE (WHAT YOU WILL NEED TO DO \"AFTER THE BIOPSY\" TO OPTIMIZE HEALING)    Keep the area clean and dry.  Try NOT to remove the bandage or get it wet for the first 24 hours.    Gently clean the area and apply surgical ointment (such as Vaseline petrolatum ointment, which is available \"over the counter\" and not a prescription) to the biopsy site for up to 2 weeks straight.  This acts to protect the wound from the outside world.      Sutures are not usually placed in this procedure.  If any sutures were placed, return for suture removal as instructed (generally 1 week for the face, 2 weeks for the body).      Take Acetaminophen (Tylenol) for discomfort, if no contraindications.  Ibuprofen or aspirin could make bleeding worse.    Call our office immediately for signs of infection: fever, chills, increased redness, warmth, tenderness, discomfort/pain, or pus or foul smell coming from the wound.    WHAT TO DO IF THERE IS ANY BLEEDING?  If a small amount of bleeding is noticed, place a clean cloth over the area and apply firm pressure " for ten minutes.  Check the wound after 10 minutes of direct pressure.  If bleeding persists, try one more time for an additional 10 minutes of direct pressure on the area.  If the bleeding becomes heavier or does not stop after the second attempt, or if you have any other questions about this procedure, then please call your [facility] Dermatologist by calling [phone] (SKIN).     I hereby acknowledge that I have reviewed and verified the site with my Dermatologist and have requested and authorized my Dermatologist to proceed with the procedure.        Scribe Attestation      I,:  Sameer Perez am acting as a scribe while in the presence of the attending physician.:       I,:  Antonietta Henson MD personally performed the services described in this documentation    as scribed in my presence.:

## 2025-04-15 NOTE — PATIENT INSTRUCTIONS
"HISTORY OF BASAL CELL CARCINOMA  Assessment and Plan:  Based on a thorough discussion of this condition and the management approach to it (including a comprehensive discussion of the known risks, side effects and potential benefits of treatment), the patient (family) agrees to implement the following specific plan:  When outside we recommend using a wide brim hat, sunglasses, long sleeve and pants, sunscreen with SPF 30+ with reapplication every 2 hours, or SPF specific clothing   Benign, reassured  Annual skin check     How can basal cell carcinoma be prevented?  The most important way to prevent BCC is to avoid sunburn. This is especially important in childhood and early life. Fair skinned individuals and those with a personal or family history of BCC should protect their skin from sun exposure daily, year-round and lifelong.  Stay indoors or under the shade in the middle of the day   Wear covering clothing   Apply high protection factor SPF50+ broad-spectrum sunscreens generously to exposed skin if outdoors   Avoid indoor tanning (sun beds, solaria)  Oral nicotinamide (vitamin B3) in a dose of 500 mg twice daily may reduce the number and severity of BCCs.     What is the outlook for basal cell carcinoma?  Most BCCs are cured by treatment. Cure is most likely if treatment is undertaken when the lesion is small.  About 50% of people with BCC develop a second one within 3 years of the first. They are also at increased risk of other skin cancers, especially melanoma. Regular self-skin examinations and long-term annual skin checks by an experienced health professional are recommended.    NEOPLASM OF UNCERTAIN BEHAVIOR OF SKIN  Assessment and Plan:  I have discussed with the patient that a sample of skin via a \"skin biopsy” would be potentially helpful to further make a specific diagnosis under the microscope.  Patient will see cost of first biopsy and will consider biopsy of the lesion on the central chest if patient " "meets deductible or if patient notices the lesion changing.   Based on a thorough discussion of this condition and the management approach to it (including a comprehensive discussion of the known risks, side effects and potential benefits of treatment), the patient (family) agrees to implement the following specific plan:    Procedure:  Skin Biopsy.  After a thorough discussion of treatment options and risk/benefits/alternatives (including but not limited to local pain, scarring, dyspigmentation, blistering, possible superinfection, and inability to confirm a diagnosis via histopathology), verbal and written consent were obtained and portion of the rash was biopsied for tissue sample.  See below for consent that was obtained from patient and subsequent Procedure Note.  PROCEDURE TANGENTIAL (SHAVE) BIOPSY NOTE:  INFORMED CONSENT DISCUSSION AND POST-OPERATIVE INSTRUCTIONS FOR PATIENT    I.  RATIONALE FOR PROCEDURE  I understand that a skin biopsy allows the Dermatologist to test a lesion or rash under the microscope to obtain a diagnosis.  It usually involves numbing the area with numbing medication and removing a small piece of skin; sometimes the area will be closed with sutures. In this specific procedure, sutures are not usually needed.  If any sutures are placed, then they are usually need to be removed in 2 weeks or less.    I understand that my Dermatologist recommends that a skin \"shave\" biopsy be performed today.  A local anesthetic, similar to the kind that a dentist uses when filling a cavity, will be injected with a very small needle into the skin area to be sampled.  The injected skin and tissue underneath \"will go to sleep” and become numb so no pain should be felt afterwards.  An instrument shaped like a tiny \"razor blade\" (shave biopsy instrument) will be used to cut a small piece of tissue and skin from the area so that a sample of tissue can be taken and examined more closely under the microscope.  A " "slight amount of bleeding will occur, but it will be stopped with direct pressure and a pressure bandage and any other appropriate methods.  I understands that a scar will form where the wound was created.  Surgical ointment will be applied to help protect the wound.  Sutures are not usually needed.    II.  RISKS AND POTENTIAL COMPLICATIONS   I understand the risks and potential complications of a skin biopsy include but are not limited to the following:  Bleeding  Infection  Pain  Scar/keloid  Skin discoloration  Incomplete Removal  Recurrence  Nerve Damage/Numbness/Loss of Function  Allergic Reaction to Anesthesia  Biopsies are diagnostic procedures and based on findings additional treatment or evaluation may be required  Loss or destruction of specimen resulting in no additional findings    My Dermatologist has explained to me the nature of the condition, the nature of the procedure, and the benefits to be reasonably expected compared with alternative approaches.  My Dermatologist has discussed the likelihood of major risks or complications of this procedure including the specific risks listed above, such as bleeding, infection, and scarring/keloid.  I understand that a scar is expected after this procedure.  I understand that my physician cannot predict if the scar will form a \"keloid,\" which extends beyond the borders of the wound that is created.  A keloid is a thick, painful, and bumpy scar.  A keloid can be difficult to treat, as it does not always respond well to therapy, which includes injecting cortisone directly into the keloid every few weeks.  While this usually reduces the pain and size of the scar, it does not eliminate it.      I understand that photographs may be taken before and after the procedure.  These will be maintained as part of the medical providers confidential records and may not be made available to me.  I further authorize the medical provider to use the photographs for teaching " "purposes or to illustrate scientific papers, books, or lectures if in his/her judgment, medical research, education, or science may benefit from its use.    I have had an opportunity to fully inquire about the risks and benefits of this procedure and its alternatives.   I have been given ample time and opportunity to ask questions and to seek a second opinion if I wished to do so.  I acknowledge that there have specifically been no guarantees as to the cosmetic results from the procedure.  I am aware that with any procedure there is always the possibility of an unexpected complication.    III. POST-PROCEDURAL CARE (WHAT YOU WILL NEED TO DO \"AFTER THE BIOPSY\" TO OPTIMIZE HEALING)    Keep the area clean and dry.  Try NOT to remove the bandage or get it wet for the first 24 hours.    Gently clean the area and apply surgical ointment (such as Vaseline petrolatum ointment, which is available \"over the counter\" and not a prescription) to the biopsy site for up to 2 weeks straight.  This acts to protect the wound from the outside world.      Sutures are not usually placed in this procedure.  If any sutures were placed, return for suture removal as instructed (generally 1 week for the face, 2 weeks for the body).      Take Acetaminophen (Tylenol) for discomfort, if no contraindications.  Ibuprofen or aspirin could make bleeding worse.    Call our office immediately for signs of infection: fever, chills, increased redness, warmth, tenderness, discomfort/pain, or pus or foul smell coming from the wound.    WHAT TO DO IF THERE IS ANY BLEEDING?  If a small amount of bleeding is noticed, place a clean cloth over the area and apply firm pressure for ten minutes.  Check the wound after 10 minutes of direct pressure.  If bleeding persists, try one more time for an additional 10 minutes of direct pressure on the area.  If the bleeding becomes heavier or does not stop after the second attempt, or if you have any other questions about " this procedure, then please call your [facility]'s Dermatologist by calling [phone] (SKIN).     I hereby acknowledge that I have reviewed and verified the site with my Dermatologist and have requested and authorized my Dermatologist to proceed with the procedure.

## 2025-04-21 PROCEDURE — 88342 IMHCHEM/IMCYTCHM 1ST ANTB: CPT | Performed by: STUDENT IN AN ORGANIZED HEALTH CARE EDUCATION/TRAINING PROGRAM

## 2025-04-21 PROCEDURE — 88305 TISSUE EXAM BY PATHOLOGIST: CPT | Performed by: STUDENT IN AN ORGANIZED HEALTH CARE EDUCATION/TRAINING PROGRAM

## 2025-04-22 ENCOUNTER — RESULTS FOLLOW-UP (OUTPATIENT)
Dept: DERMATOLOGY | Facility: CLINIC | Age: 35
End: 2025-04-22

## 2025-04-22 NOTE — RESULT ENCOUNTER NOTE
DERMATOPATHOLOGY RESULT NOTE    Results reviewed by ordering physician.  Called patient to personally discuss results. Discussed results with patient.       Instructions for Clinical Derm Team:   (remember to route Result Note to appropriate staff):    None    Result & Plan by Specimen:    Specimen A: benign  Plan: reassured, benign    Tissue Exam: [id number]  Order: [id number]   Status: Final result      Dx: Neoplasm of uncertain behavior    Test Result Released: Yes (seen)    View Follow-Up Encounter     Component  Ref Range & Units (hover)   Case Report  Surgical Pathology Report                         Case: [id number]                                  Authorizing Provider:  Antonietta Henson MD           Collected:           04/15/2025 1406              Ordering Location:     [facility]      Received:            04/15/2025 1406                                     [place]                                                                      Pathologist:           Enrico Pedersen MD                                                          Specimen:    Skin, Other, Specimen A; left inner gluteal cleft                                        Final Diagnosis  A. Skin, left inner gluteal cleft, shave biopsy:    VERRUCOUS KERATOSIS.      Electronically signed by Enrico Pedersen MD on 4/21/2025 at 1655 EDT  Additional Information   All reported additional testing was performed with appropriately reactive controls.  These tests were developed and their performance characteristics determined by [facility] or appropriate performing facility, though some tests may be performed on tissues which have not been validated for performance characteristics (such as staining performed on alcohol exposed cell blocks and decalcified tissues).  Results should be interpreted with caution and in the context of the patients' clinical condition. These tests may not be cleared or approved by the U.S. Food and Drug  "Administration, though the FDA has determined that such clearance or approval is not necessary. These tests are used for clinical purposes and they should not be regarded as investigational or for research. This laboratory has been approved by CLIA 88, designated as a high-complexity laboratory and is qualified to perform these tests.  .  Gross Description   A. The specimen is received in formalin, labeled with the patient's name and hospital number, and is designated \" left inner gluteal cleft\".  The specimen consists of a tan superficial shaving of skin measuring 0.8 x 0.6 x 0.1 cm.  The skin surface exhibits a pale-tan plaque-like focus measuring 0.7 x 0.6 x 0.1 cm.  The margin is inked green.  The skin surface is inked red.  The specimen is bisected.  Entirely submitted. One cassette.  Between sponges.    Note: The estimated total formalin fixation time based upon information provided by the submitting clinician and the standard processing schedule is under 72 hours.    MCrites  Clinical Information   ATTENTION:  DERMPATH GROUP    SPECIMEN A; Skin; Anatomic Location: left inner gluteal cleft; Procedure/Protocol: Skin Specimen (submit in FOR[facility]):Tangential Biopsy (includes shave, scoop, saucerization, curette) (CPT 04949; each additional tangential biopsy is CPT 38330)  34 y.o. year old  Female with a Morphological Description:1 cm x 0.7 cm pink plaque  Differential Diagnosis and/or Specific Clinical Question:verrucous keratosis r/o squamous cell carcinoma in situ  Resulting Agency [facility]          Specimen Collected: 04/15/25  2:06 PM Last Resulted: 04/21/25  4:55 PM    "

## 2025-05-22 ENCOUNTER — OFFICE VISIT (OUTPATIENT)
Dept: FAMILY MEDICINE CLINIC | Facility: CLINIC | Age: 35
End: 2025-05-22

## 2025-05-22 VITALS
DIASTOLIC BLOOD PRESSURE: 70 MMHG | TEMPERATURE: 97.8 F | HEART RATE: 66 BPM | BODY MASS INDEX: 24.4 KG/M2 | OXYGEN SATURATION: 99 % | HEIGHT: 68 IN | WEIGHT: 161 LBS | SYSTOLIC BLOOD PRESSURE: 114 MMHG

## 2025-05-22 DIAGNOSIS — Z00.00 ANNUAL PHYSICAL EXAM: Primary | ICD-10-CM

## 2025-05-22 DIAGNOSIS — Z13.220 ENCOUNTER FOR LIPID SCREENING FOR CARDIOVASCULAR DISEASE: ICD-10-CM

## 2025-05-22 DIAGNOSIS — Z13.6 ENCOUNTER FOR LIPID SCREENING FOR CARDIOVASCULAR DISEASE: ICD-10-CM

## 2025-05-22 DIAGNOSIS — Z12.4 SCREENING FOR CERVICAL CANCER: ICD-10-CM

## 2025-05-22 PROCEDURE — G0476 HPV COMBO ASSAY CA SCREEN: HCPCS | Performed by: FAMILY MEDICINE

## 2025-05-22 PROCEDURE — G0145 SCR C/V CYTO,THINLAYER,RESCR: HCPCS | Performed by: FAMILY MEDICINE

## 2025-05-22 NOTE — PROGRESS NOTES
"Annual GYN Note     Subjective     Tabitha Jaimes is a 34 y.o.  female who presents today for her Annual GYN exam. She is without complaints. She is sexually active.The patient has not had a new sexual partner(s). Contraceptive Method: none. Patient reports 33-40 day cycles. Denies abdominal, pelvic pain, problems with her bladder or bowels, vaginal bleeding, vaginal discharge.     Screenings / HCM   Vaccines: Indicated today:   Immunization History   Administered Date(s) Administered   • COVID-19 PFIZER VACCINE 0.3 ML IM 2021   • Rho (D) Immune Globulin 2023, 2023   • Tdap 10/11/2023        Osteoporosis: No history of pathologic fractures. No steroid use, smoking, risk of falls, excessive alcohol use. Will start at age 65 unless otherwise indicated.     Current indication: no.     Cervical Cancer screening:     History of Abnormal Pap: no.    Last Pap: 2022, normal.     Colon Cancer screening:     Family history of colon cancer: yes, one relative, unsure.     Last colonoscopy: N/A.     Breast Cancer screening:     Family history of breast cancer: yes, grandparent possible, unsure who.    Last mammogram: N/A.     Patient does have a dedicated exercise regimen.   Patient does have a balanced diet.   Patient does not meet criteria for lung cancer screening.     Objective   /70   Pulse 66   Temp 97.8 °F (36.6 °C)   Ht 5' 8\" (1.727 m)   Wt 73 kg (161 lb)   SpO2 99%   BMI 24.48 kg/m²   LMP: 25  General:  Well developed, well nourished, NAD.   Neck:  Supple, no thyromegaly.   Cardio:  Regular rate and rhythm.   Lungs:  Clear to auscultation bilaterally.   Abd.:  Soft, non-tender, non-distended, no hepatosplenomegaly, no hernia.   Breast:  Not indicated (USPSTF Guidelines).   :  External genitalia is normal without lesions.  Vagina is pink and vault without discharge.  Non-tender cervix, without lesion or discharge.  Uterus is mobile, non-tender, and normal in size / shape.  " Adnexa: Without masses or tenderness B/L.     Assessment / Plan   34 y.o.  for annual GYN exam.   - Pap smear was performed.   - Eric Risk HPV was performed.   - Mammogram was not ordered.   - Colonoscopy was not ordered.   - DEXA Scan was not ordered.   - Contraception: no method.  - Recommend multivitamin, calcium and vitamin D supplementation.   - Return in 1 year unless otherwise indicated.     Carmen Snider D.O    [facility]  2025 3:05 PM

## 2025-05-23 LAB
HPV HR 12 DNA CVX QL NAA+PROBE: NEGATIVE
HPV16 DNA CVX QL NAA+PROBE: NEGATIVE
HPV18 DNA CVX QL NAA+PROBE: NEGATIVE

## 2025-05-28 LAB
LAB AP GYN PRIMARY INTERPRETATION: NORMAL
Lab: NORMAL

## 2025-06-02 ENCOUNTER — RESULTS FOLLOW-UP (OUTPATIENT)
Dept: FAMILY MEDICINE CLINIC | Facility: CLINIC | Age: 35
End: 2025-06-02

## 2025-06-24 ENCOUNTER — OFFICE VISIT (OUTPATIENT)
Dept: FAMILY MEDICINE CLINIC | Facility: CLINIC | Age: 35
End: 2025-06-24
Payer: COMMERCIAL

## 2025-06-24 VITALS
HEART RATE: 69 BPM | TEMPERATURE: 98.3 F | DIASTOLIC BLOOD PRESSURE: 84 MMHG | HEIGHT: 68 IN | OXYGEN SATURATION: 99 % | WEIGHT: 161 LBS | BODY MASS INDEX: 24.4 KG/M2 | SYSTOLIC BLOOD PRESSURE: 122 MMHG

## 2025-06-24 DIAGNOSIS — M25.552 LEFT HIP PAIN: ICD-10-CM

## 2025-06-24 DIAGNOSIS — Z00.00 ANNUAL PHYSICAL EXAM: Primary | ICD-10-CM

## 2025-06-24 DIAGNOSIS — M54.42 CHRONIC LEFT-SIDED LOW BACK PAIN WITH LEFT-SIDED SCIATICA: ICD-10-CM

## 2025-06-24 DIAGNOSIS — G89.29 CHRONIC LEFT-SIDED LOW BACK PAIN WITH LEFT-SIDED SCIATICA: ICD-10-CM

## 2025-06-24 PROCEDURE — 99395 PREV VISIT EST AGE 18-39: CPT | Performed by: FAMILY MEDICINE

## 2025-06-24 PROCEDURE — 99214 OFFICE O/P EST MOD 30 MIN: CPT | Performed by: FAMILY MEDICINE

## 2025-06-24 NOTE — PROGRESS NOTES
Adult Annual Physical  Name: Tabitha Jaimes      : 1990      MRN: [id number]  Encounter Provider: Carmen Lugo DO  Encounter Date: 2025   Encounter department: [facility] [address]    :  Assessment & Plan  Annual physical exam         Chronic left-sided low back pain with left-sided sciatica  Declines prescription medication at this point, will consider diclofenac if pain worsens.   Orders:  •  Ambulatory Referral to [facility]; Future  •  XR hip/pelv 2-3 vws left if performed; Future  •  XR spine lumbar minimum 4 views non injury; Future    Left hip pain    Orders:  •  XR hip/pelv 2-3 vws left if performed; Future  •  XR spine lumbar minimum 4 views non injury; Future      Preventive Screenings:    - Cervical cancer screening: screening up-to-date        History of Present Illness     Adult Annual Physical:  Patient presents for annual physical. Has been seeing a chiropractor for her lower back and hip pain since the end of  every 1-2 weeks.   States that the discomfort in the left hip and lower back is almost constant.   Pain is sharp, sometimes feels like a shock and comes into the leg in the last 1 month.   Not using anything OTC. States that she used Meloxicam previously and this did not help.   Using heating pad and some days helps and others it doesn't.   Finds a lot of discomfort with siting and sleeping.   Had XR with chiro, will send over.   Feels tightness in the left hip, ROM feels restricted.     History of herniated disc in . Was previously on Lyrica. .     Diet and Physical Activity:  - Diet/Nutrition: well balanced diet.  - Exercise: walking and 5-7 times a week on average. yoga a few times per week.    Depression Screening:  - PHQ-2 Score: 0    General Health:  - Sleep: sleeps poorly. 6 hours nightly.  - Hearing: normal hearing bilateral ears.  - Vision: most recent eye exam < 1 year ago and wears glasses and contacts.  -  "Dental: regular dental visits, floss regularly and brushes teeth twice daily.    /GYN Health:  - Follows with GYN: yes.   - Menopause: premenopausal.   - Last menstrual cycle: 5/23/2025.   - History of STDs: no    Review of Systems      Objective   /84 (BP Location: Left arm, Patient Position: Sitting, Cuff Size: Standard)   Pulse 69   Temp 98.3 °F (36.8 °C) (Temporal)   Ht 5' 8\" (1.727 m)   Wt 73 kg (161 lb)   SpO2 99%   BMI 24.48 kg/m²     Physical Exam  Vitals reviewed.   Constitutional:       General: She is not in acute distress.     Appearance: Normal appearance.   HENT:      Head: Normocephalic and atraumatic.      Right Ear: External ear normal.      Left Ear: External ear normal.      Nose: Nose normal.      Mouth/Throat:      Mouth: Mucous membranes are moist.     Eyes:      Extraocular Movements: Extraocular movements intact.      Conjunctiva/sclera: Conjunctivae normal.      Pupils: Pupils are equal, round, and reactive to light.       Cardiovascular:      Rate and Rhythm: Normal rate and regular rhythm.      Heart sounds: Normal heart sounds.   Pulmonary:      Effort: Pulmonary effort is normal.      Breath sounds: Normal breath sounds.   Abdominal:      General: Bowel sounds are normal. There is no distension.      Palpations: Abdomen is soft.      Tenderness: There is no abdominal tenderness.     Musculoskeletal:      Cervical back: Neck supple.      Right lower leg: No edema.      Left lower leg: No edema.      Comments: SLR + on the left.   Left hip ROM intact, passive and active   Lymphadenopathy:      Cervical: No cervical adenopathy.     Skin:     General: Skin is warm.      Capillary Refill: Capillary refill takes less than 2 seconds.      Findings: No rash.     Neurological:      Mental Status: She is alert. Mental status is at baseline.           DO Ganesh Schroeder [facility]  6/24/2025 1:56 PM      "

## 2025-06-24 NOTE — PATIENT INSTRUCTIONS
"Patient Education     Routine physical for adults   The Basics   Written by the doctors and editors at [facility]   What is a physical? -- A physical is a routine visit, or \"check-up,\" with your doctor. You might also hear it called a \"wellness visit\" or \"preventive visit.\"  During each visit, the doctor will:   Ask about your physical and mental health   Ask about your habits, behaviors, and lifestyle   Do an exam   Give you vaccines if needed   Talk to you about any medicines you take   Give advice about your health   Answer your questions  Getting regular check-ups is an important part of taking care of your health. It can help your doctor find and treat any problems you have. But it's also important for preventing health problems.  A routine physical is different from a \"sick visit.\" A sick visit is when you see a doctor because of a health concern or problem. Since physicals are scheduled ahead of time, you can think about what you want to ask the doctor.  How often should I get a physical? -- It depends on your age and health. In general, for people age 21 years and older:   If you are younger than 50 years, you might be able to get a physical every 3 years.   If you are 50 years or older, your doctor might recommend a physical every year.  If you have an ongoing health condition, like diabetes or high blood pressure, your doctor will probably want to see you more often.  What happens during a physical? -- In general, each visit will include:   Physical exam - The doctor or nurse will check your height, weight, heart rate, and blood pressure. They will also look at your eyes and ears. They will ask about how you are feeling and whether you have any symptoms that bother you.   Medicines - It's a good idea to bring a list of all the medicines you take to each doctor visit. Your doctor will talk to you about your medicines and answer any questions. Tell them if you are having any side effects that bother you. You " "should also tell them if you are having trouble paying for any of your medicines.   Habits and behaviors - This includes:   Your diet   Your exercise habits   Whether you smoke, drink alcohol, or use drugs   Whether you are sexually active   Whether you feel safe at home  Your doctor will talk to you about things you can do to improve your health and lower your risk of health problems. They will also offer help and support. For example, if you want to quit smoking, they can give you advice and might prescribe medicines. If you want to improve your diet or get more physical activity, they can help you with this, too.   Lab tests, if needed - The tests you get will depend on your age and situation. For example, your doctor might want to check your:   Cholesterol   Blood sugar   Iron level   Vaccines - The recommended vaccines will depend on your age, health, and what vaccines you already had. Vaccines are very important because they can prevent certain serious or deadly infections.   Discussion of screening - \"Screening\" means checking for diseases or other health problems before they cause symptoms. Your doctor can recommend screening based on your age, risk, and preferences. This might include tests to check for:   Cancer, such as breast, prostate, cervical, ovarian, colorectal, prostate, lung, or skin cancer   Sexually transmitted infections, such as chlamydia and gonorrhea   Mental health conditions like depression and anxiety  Your doctor will talk to you about the different types of screening tests. They can help you decide which screenings to have. They can also explain what the results might mean.   Answering questions - The physical is a good time to ask the doctor or nurse questions about your health. If needed, they can refer you to other doctors or specialists, too.  Adults older than 65 years often need other care, too. As you get older, your doctor will talk to you about:   How to prevent falling at " home   Hearing or vision tests   Memory testing   How to take your medicines safely   Making sure that you have the help and support you need at home  All topics are updated as new evidence becomes available and our peer review process is complete.  This topic retrieved from [employer] on: May 02, 2024.  Topic 408311 Version 1.0  Release: 32.4.3 - C32.122  © 2024 UpToDate, Inc. and/or its affiliates. All rights reserved.  Consumer Information Use and Disclaimer   Disclaimer: This generalized information is a limited summary of diagnosis, treatment, and/or medication information. It is not meant to be comprehensive and should be used as a tool to help the user understand and/or assess potential diagnostic and treatment options. It does NOT include all information about conditions, treatments, medications, side effects, or risks that may apply to a specific patient. It is not intended to be medical advice or a substitute for the medical advice, diagnosis, or treatment of a health care provider based on the health care provider's examination and assessment of a patient's specific and unique circumstances. Patients must speak with a health care provider for complete information about their health, medical questions, and treatment options, including any risks or benefits regarding use of medications. This information does not endorse any treatments or medications as safe, effective, or approved for treating a specific patient. UpToDate, Inc. and its affiliates disclaim any warranty or liability relating to this information or the use thereof.The use of this information is governed by the Terms of Use, available at https://www.woltersVANDOLAYuwer.com/en/know/clinical-effectiveness-terms. 2024© UpToDate, Inc. and its affiliates and/or licensors. All rights reserved.  Copyright   © 2024 UpToDate, Inc. and/or its affiliates. All rights reserved.

## 2025-07-24 ENCOUNTER — OFFICE VISIT (OUTPATIENT)
Dept: FAMILY MEDICINE CLINIC | Facility: CLINIC | Age: 35
End: 2025-07-24
Payer: COMMERCIAL

## 2025-07-24 ENCOUNTER — APPOINTMENT (OUTPATIENT)
Dept: LAB | Facility: HOSPITAL | Age: 35
End: 2025-07-24
Payer: COMMERCIAL

## 2025-07-24 ENCOUNTER — TELEPHONE (OUTPATIENT)
Dept: FAMILY MEDICINE CLINIC | Facility: CLINIC | Age: 35
End: 2025-07-24

## 2025-07-24 VITALS
OXYGEN SATURATION: 96 % | WEIGHT: 157 LBS | HEART RATE: 62 BPM | HEIGHT: 68 IN | DIASTOLIC BLOOD PRESSURE: 64 MMHG | BODY MASS INDEX: 23.79 KG/M2 | TEMPERATURE: 99.2 F | SYSTOLIC BLOOD PRESSURE: 112 MMHG

## 2025-07-24 DIAGNOSIS — Z13.220 ENCOUNTER FOR LIPID SCREENING FOR CARDIOVASCULAR DISEASE: ICD-10-CM

## 2025-07-24 DIAGNOSIS — Z00.00 ANNUAL PHYSICAL EXAM: ICD-10-CM

## 2025-07-24 DIAGNOSIS — Z11.1 SCREENING-PULMONARY TB: ICD-10-CM

## 2025-07-24 DIAGNOSIS — Z13.6 ENCOUNTER FOR LIPID SCREENING FOR CARDIOVASCULAR DISEASE: ICD-10-CM

## 2025-07-24 DIAGNOSIS — J01.10 SUBACUTE FRONTAL SINUSITIS: Primary | ICD-10-CM

## 2025-07-24 LAB
ALBUMIN SERPL BCG-MCNC: 4.4 G/DL (ref 3.5–5)
ALP SERPL-CCNC: 57 U/L (ref 34–104)
ALT SERPL W P-5'-P-CCNC: 15 U/L (ref 7–52)
ANION GAP SERPL CALCULATED.3IONS-SCNC: 5 MMOL/L (ref 4–13)
AST SERPL W P-5'-P-CCNC: 18 U/L (ref 13–39)
BASOPHILS # BLD AUTO: 0.04 THOUSANDS/ÂΜL (ref 0–0.1)
BASOPHILS NFR BLD AUTO: 1 % (ref 0–1)
BILIRUB SERPL-MCNC: 1.03 MG/DL (ref 0.2–1)
BUN SERPL-MCNC: 11 MG/DL (ref 5–25)
CALCIUM SERPL-MCNC: 9.6 MG/DL (ref 8.4–10.2)
CHLORIDE SERPL-SCNC: 105 MMOL/L (ref 96–108)
CHOLEST SERPL-MCNC: 191 MG/DL (ref ?–200)
CO2 SERPL-SCNC: 30 MMOL/L (ref 21–32)
CREAT SERPL-MCNC: 0.69 MG/DL (ref 0.6–1.3)
EOSINOPHIL # BLD AUTO: 0.13 THOUSAND/ÂΜL (ref 0–0.61)
EOSINOPHIL NFR BLD AUTO: 3 % (ref 0–6)
ERYTHROCYTE [DISTWIDTH] IN BLOOD BY AUTOMATED COUNT: 13 % (ref 11.6–15.1)
GFR SERPL CREATININE-BSD FRML MDRD: 113 ML/MIN/1.73SQ M
GLUCOSE P FAST SERPL-MCNC: 84 MG/DL (ref 65–99)
HCT VFR BLD AUTO: 44.1 % (ref 34.8–46.1)
HDLC SERPL-MCNC: 58 MG/DL
HGB BLD-MCNC: 14.3 G/DL (ref 11.5–15.4)
IMM GRANULOCYTES # BLD AUTO: 0.01 THOUSAND/UL (ref 0–0.2)
IMM GRANULOCYTES NFR BLD AUTO: 0 % (ref 0–2)
LDLC SERPL CALC-MCNC: 123 MG/DL (ref 0–100)
LYMPHOCYTES # BLD AUTO: 2.62 THOUSANDS/ÂΜL (ref 0.6–4.47)
LYMPHOCYTES NFR BLD AUTO: 49 % (ref 14–44)
MCH RBC QN AUTO: 29.1 PG (ref 26.8–34.3)
MCHC RBC AUTO-ENTMCNC: 32.4 G/DL (ref 31.4–37.4)
MCV RBC AUTO: 90 FL (ref 82–98)
MONOCYTES # BLD AUTO: 0.31 THOUSAND/ÂΜL (ref 0.17–1.22)
MONOCYTES NFR BLD AUTO: 6 % (ref 4–12)
NEUTROPHILS # BLD AUTO: 2.16 THOUSANDS/ÂΜL (ref 1.85–7.62)
NEUTS SEG NFR BLD AUTO: 41 % (ref 43–75)
NONHDLC SERPL-MCNC: 133 MG/DL
NRBC BLD AUTO-RTO: 0 /100 WBCS
PLATELET # BLD AUTO: 209 THOUSANDS/UL (ref 149–390)
PMV BLD AUTO: 9.5 FL (ref 8.9–12.7)
POTASSIUM SERPL-SCNC: 4.6 MMOL/L (ref 3.5–5.3)
PROT SERPL-MCNC: 7.5 G/DL (ref 6.4–8.4)
RBC # BLD AUTO: 4.92 MILLION/UL (ref 3.81–5.12)
SODIUM SERPL-SCNC: 140 MMOL/L (ref 135–147)
TRIGL SERPL-MCNC: 50 MG/DL (ref ?–150)
WBC # BLD AUTO: 5.27 THOUSAND/UL (ref 4.31–10.16)

## 2025-07-24 PROCEDURE — 36415 COLL VENOUS BLD VENIPUNCTURE: CPT

## 2025-07-24 PROCEDURE — 99213 OFFICE O/P EST LOW 20 MIN: CPT | Performed by: FAMILY MEDICINE

## 2025-07-24 PROCEDURE — 85025 COMPLETE CBC W/AUTO DIFF WBC: CPT

## 2025-07-24 PROCEDURE — 80053 COMPREHEN METABOLIC PANEL: CPT

## 2025-07-24 PROCEDURE — 86480 TB TEST CELL IMMUN MEASURE: CPT

## 2025-07-24 PROCEDURE — 80061 LIPID PANEL: CPT

## 2025-07-24 NOTE — TELEPHONE ENCOUNTER
Dr Lugo, pt asking to have abx resend to local [facility] :   amoxicillin-clavulanate (AUGMENTIN) 875-125 mg per table     Pharmacy updated, Thank You!

## 2025-07-24 NOTE — PROGRESS NOTES
"Name: Tabitha Jaimes      : 1990      MRN: [id number]  Encounter Provider: Carmen Lugo DO  Encounter Date: 2025   Encounter department: [facility] [address]    Assessment & Plan  Subacute frontal sinusitis    Orders:  •  amoxicillin-clavulanate (AUGMENTIN) 875-125 mg per tablet; Take 1 tablet by mouth every 12 (twelve) hours for 7 days       History of Present Illness   HPI    Notes that she has had sinus congestion and pressure that has remain for about 2 weeks, after a cold she had. Notes that her ears feel pressure and clogged. Notes thick green/yellow nasal mucus. Denies fever or chills. Ramona cough or sore throat. Notes some scratchy throat.     Has been taking Zyrtec and Flonase without improvement.   Tried Mucinex D, states that this was mildly helpful but not much.    Review of Systems    Objective   /64   Pulse 62   Temp 99.2 °F (37.3 °C)   Ht 5' 8\" (1.727 m)   Wt 71.2 kg (157 lb)   SpO2 96%   BMI 23.87 kg/m²      Physical Exam  Vitals reviewed.   Constitutional:       General: She is not in acute distress.     Appearance: Normal appearance.   HENT:      Head: Normocephalic and atraumatic.      Right Ear: Tympanic membrane, ear canal and external ear normal.      Left Ear: Ear canal and external ear normal. Tympanic membrane is injected and bulging.      Nose: Congestion present.      Mouth/Throat:      Mouth: Mucous membranes are moist.     Eyes:      Extraocular Movements: Extraocular movements intact.      Conjunctiva/sclera: Conjunctivae normal.       Cardiovascular:      Rate and Rhythm: Normal rate and regular rhythm.      Heart sounds: Normal heart sounds.   Pulmonary:      Effort: Pulmonary effort is normal.      Breath sounds: Normal breath sounds.   Abdominal:      General: Bowel sounds are normal. There is no distension.      Palpations: Abdomen is soft.      Tenderness: There is no abdominal tenderness.     Musculoskeletal:     "  Cervical back: Neck supple.      Right lower leg: No edema.      Left lower leg: No edema.   Lymphadenopathy:      Cervical: No cervical adenopathy.     Skin:     General: Skin is warm.      Capillary Refill: Capillary refill takes less than 2 seconds.      Findings: No rash.     Neurological:      Mental Status: She is alert. Mental status is at baseline.           DO Ganesh Schroeder [facility]  7/24/2025 2:42 PM

## 2025-07-25 LAB
GAMMA INTERFERON BACKGROUND BLD IA-ACNC: 0.04 IU/ML
M TB IFN-G BLD-IMP: NEGATIVE
M TB IFN-G CD4+ BCKGRND COR BLD-ACNC: 0.01 IU/ML
M TB IFN-G CD4+ BCKGRND COR BLD-ACNC: 0.01 IU/ML
MITOGEN IGNF BCKGRD COR BLD-ACNC: 9.96 IU/ML

## 2025-08-06 ENCOUNTER — EVALUATION (OUTPATIENT)
Dept: PHYSICAL THERAPY | Facility: CLINIC | Age: 35
End: 2025-08-06
Attending: FAMILY MEDICINE
Payer: COMMERCIAL

## 2025-08-06 DIAGNOSIS — M54.42 CHRONIC LEFT-SIDED LOW BACK PAIN WITH LEFT-SIDED SCIATICA: Primary | ICD-10-CM

## 2025-08-06 DIAGNOSIS — G89.29 CHRONIC LEFT-SIDED LOW BACK PAIN WITH LEFT-SIDED SCIATICA: Primary | ICD-10-CM

## 2025-08-06 PROCEDURE — 97161 PT EVAL LOW COMPLEX 20 MIN: CPT | Performed by: PHYSICAL THERAPIST

## 2025-08-06 PROCEDURE — 97110 THERAPEUTIC EXERCISES: CPT | Performed by: PHYSICAL THERAPIST

## 2025-08-14 ENCOUNTER — OFFICE VISIT (OUTPATIENT)
Dept: PHYSICAL THERAPY | Facility: CLINIC | Age: 35
End: 2025-08-14
Attending: FAMILY MEDICINE
Payer: COMMERCIAL

## 2025-08-15 ENCOUNTER — OFFICE VISIT (OUTPATIENT)
Dept: PHYSICAL THERAPY | Facility: CLINIC | Age: 35
End: 2025-08-15
Attending: FAMILY MEDICINE
Payer: COMMERCIAL

## 2025-08-19 ENCOUNTER — OFFICE VISIT (OUTPATIENT)
Dept: PHYSICAL THERAPY | Facility: CLINIC | Age: 35
End: 2025-08-19
Attending: FAMILY MEDICINE
Payer: COMMERCIAL

## 2025-08-19 DIAGNOSIS — G89.29 CHRONIC LEFT-SIDED LOW BACK PAIN WITH LEFT-SIDED SCIATICA: Primary | ICD-10-CM

## 2025-08-19 DIAGNOSIS — M54.42 CHRONIC LEFT-SIDED LOW BACK PAIN WITH LEFT-SIDED SCIATICA: Primary | ICD-10-CM

## 2025-08-19 PROCEDURE — 97110 THERAPEUTIC EXERCISES: CPT

## 2025-08-20 ENCOUNTER — OFFICE VISIT (OUTPATIENT)
Dept: PHYSICAL THERAPY | Facility: CLINIC | Age: 35
End: 2025-08-20
Attending: FAMILY MEDICINE
Payer: COMMERCIAL

## 2025-08-20 DIAGNOSIS — G89.29 CHRONIC LEFT-SIDED LOW BACK PAIN WITH LEFT-SIDED SCIATICA: Primary | ICD-10-CM

## 2025-08-20 DIAGNOSIS — M54.42 CHRONIC LEFT-SIDED LOW BACK PAIN WITH LEFT-SIDED SCIATICA: Primary | ICD-10-CM

## 2025-08-20 PROCEDURE — 97110 THERAPEUTIC EXERCISES: CPT | Performed by: PHYSICAL THERAPIST
